# Patient Record
Sex: FEMALE | Race: BLACK OR AFRICAN AMERICAN | Employment: UNEMPLOYED | URBAN - METROPOLITAN AREA
[De-identification: names, ages, dates, MRNs, and addresses within clinical notes are randomized per-mention and may not be internally consistent; named-entity substitution may affect disease eponyms.]

---

## 2017-01-10 ENCOUNTER — DOCUMENTATION ONLY (OUTPATIENT)
Dept: FAMILY MEDICINE CLINIC | Age: 47
End: 2017-01-10

## 2017-01-10 NOTE — LETTER
1/10/2017 Kristine Ferrera [address] Aishwarya Mcwilliams [facility] [address] [id number] Dear Ms. Kristine Ferrera, We had an appointment reserved for you 12/28/2016 and were concerned when you did not show or call within 24 hours to cancel the appointment. Our policy is to call patients two days prior to their appointment to remind them of the date and time. We perform these calls as a courtesy to our patients and to allow us the opportunity to rebook the time slot should the appointment not be necessary. Recognizing that everyones time is valuable and that appointment time is limited, we ask that you provide 24 hours notice if you are unable to keep your appointment. Please call us at your earliest convenience to reschedule your appointment as your provider felt it was important to see you. Thank you for your anticipated cooperation. The scheduling staff: 
 
[address] [place] [address],[address] [place] [id number] 
[phone]

## 2017-01-12 ENCOUNTER — DOCUMENTATION ONLY (OUTPATIENT)
Dept: FAMILY MEDICINE CLINIC | Age: 47
End: 2017-01-12

## 2017-01-16 ENCOUNTER — OFFICE VISIT (OUTPATIENT)
Dept: FAMILY MEDICINE CLINIC | Age: 47
End: 2017-01-16

## 2017-01-16 ENCOUNTER — HOSPITAL ENCOUNTER (OUTPATIENT)
Dept: LAB | Age: 47
Discharge: HOME | End: 2017-01-16
Payer: MEDICARE

## 2017-01-16 VITALS
TEMPERATURE: 96.1 F | HEART RATE: 90 BPM | RESPIRATION RATE: 14 BRPM | WEIGHT: 225 LBS | SYSTOLIC BLOOD PRESSURE: 140 MMHG | HEIGHT: 66 IN | BODY MASS INDEX: 36.16 KG/M2 | DIASTOLIC BLOOD PRESSURE: 91 MMHG | OXYGEN SATURATION: 99 %

## 2017-01-16 DIAGNOSIS — E11.42 TYPE 2 DIABETES MELLITUS WITH DIABETIC POLYNEUROPATHY, WITH LONG-TERM CURRENT USE OF INSULIN (HCC): ICD-10-CM

## 2017-01-16 DIAGNOSIS — N17.9 AKI (ACUTE KIDNEY INJURY) (HCC): Primary | ICD-10-CM

## 2017-01-16 DIAGNOSIS — I10 ESSENTIAL HYPERTENSION: ICD-10-CM

## 2017-01-16 DIAGNOSIS — M54.31 SCIATICA OF RIGHT SIDE: ICD-10-CM

## 2017-01-16 DIAGNOSIS — Z79.4 TYPE 2 DIABETES MELLITUS WITH DIABETIC POLYNEUROPATHY, WITH LONG-TERM CURRENT USE OF INSULIN (HCC): ICD-10-CM

## 2017-01-16 DIAGNOSIS — I50.22 CHRONIC SYSTOLIC CONGESTIVE HEART FAILURE (HCC): ICD-10-CM

## 2017-01-16 DIAGNOSIS — J44.9 CHRONIC OBSTRUCTIVE PULMONARY DISEASE, UNSPECIFIED COPD TYPE (HCC): ICD-10-CM

## 2017-01-16 DIAGNOSIS — E78.5 HYPERLIPIDEMIA, UNSPECIFIED HYPERLIPIDEMIA TYPE: ICD-10-CM

## 2017-01-16 PROCEDURE — 80307 DRUG TEST PRSMV CHEM ANLYZR: CPT | Performed by: INTERNAL MEDICINE

## 2017-01-16 RX ORDER — OXYCODONE AND ACETAMINOPHEN 10; 325 MG/1; MG/1
1 TABLET ORAL
Qty: 120 TAB | Refills: 0 | Status: SHIPPED | OUTPATIENT
Start: 2017-01-16 | End: 2017-02-10 | Stop reason: SDUPTHER

## 2017-01-16 RX ORDER — CYCLOBENZAPRINE HCL 10 MG
10 TABLET ORAL
Qty: 90 TAB | Refills: 2 | Status: SHIPPED | OUTPATIENT
Start: 2017-01-16 | End: 2017-02-03 | Stop reason: SDUPTHER

## 2017-01-16 NOTE — MR AVS SNAPSHOT
Visit Information Date & Time Provider Department Dept. Phone Encounter #  
 1/16/2017 11:45 AM John Venegas, Gerardo [facility] [phone] [id number] Upcoming Health Maintenance Date Due  
 LIPID PANEL Q1 1972 FOOT EXAM Q1 1/19/1982 MICROALBUMIN Q1 1/19/1982 EYE EXAM RETINAL OR DILATED Q1 1/19/1982 Pneumococcal 19-64 Medium Risk (1 of 1 - PPSV23) 1/19/1991 DTaP/Tdap/Td series (1 - Tdap) 1/19/1993 PAP AKA CERVICAL CYTOLOGY 1/19/1993 HEMOGLOBIN A1C Q6M 6/2/2017 Allergies as of 1/16/2017  Review Complete On: 1/16/2017 By: John Venegas MD  
  
 Severity Noted Reaction Type Reactions [place] Juice High 12/03/2016    Anaphylaxis Pcn [Penicillins]  09/25/2016    Rash Current Immunizations  Reviewed on 12/3/2016 Name Date Influenza Vaccine 10/1/2016 Not reviewed this visit You Were Diagnosed With   
  
 Codes Comments PRETTY (acute kidney injury) ([facility]    -  Primary ICD-10-CM: N17.9 ICD-9-CM: 584.9 Sciatica of right side     ICD-10-CM: M54.31 
ICD-9-CM: 498. 3 Chronic obstructive pulmonary disease, unspecified COPD type ([facility]     ICD-10-CM: J44.9 ICD-9-CM: 033 Type 2 diabetes mellitus with diabetic polyneuropathy, with long-term current use of insulin (HCC)     ICD-10-CM: E11.42, Z79.4 ICD-9-CM: 250.60, 357.2, V58.67 Chronic systolic congestive heart failure (HCC)     ICD-10-CM: I50.22 ICD-9-CM: 428.22, 428.0 Essential hypertension     ICD-10-CM: I10 
ICD-9-CM: 401.9 Hyperlipidemia, unspecified hyperlipidemia type     ICD-10-CM: E78.5 ICD-9-CM: 272.4 Vitals BP Pulse Temp Resp Height(growth percentile) Weight(growth percentile) (!) 140/91 (BP 1 Location: Right arm, BP Patient Position: Sitting) 90 96.1 °F (35.6 °C) (Oral) 14 5' 6\" (1.676 m) 225 lb (102.1 kg) LMP SpO2 BMI OB Status Smoking Status 01/12/2017 99% 36.32 kg/m2 Having regular periods Current Every Day Smoker Vitals History BMI and BSA Data Body Mass Index Body Surface Area  
 36.32 kg/m 2 2.18 m 2 Preferred Pharmacy Pharmacy Name [facility] [address], [place] Zuh[place] [phone] Your Updated Medication List  
  
   
This list is accurate as of: 1/16/17 12:27 PM.  Always use your most recent med list.  
  
  
  
  
 albuterol 90 mcg/actuation inhaler Commonly known as:  PROVENTIL HFA, VENTOLIN HFA, PROAIR HFA Take 2 Puffs by inhalation every four (4) hours as needed for Wheezing. aspirin delayed-release 81 mg tablet Take 1 Tab by mouth daily. atorvastatin 20 mg tablet Commonly known as:  LIPITOR Take  by mouth daily. cloNIDine HCl 0.1 mg tablet Commonly known as:  CATAPRES Take 0.1 mg by mouth two (2) times a day. cyclobenzaprine 10 mg tablet Commonly known as:  FLEXERIL Take 1 Tab by mouth three (3) times daily as needed for Muscle Spasm(s). fluticasone-salmeterol 500-50 mcg/dose diskus inhaler Commonly known as:  ADVAIR DISKUS Take 1 Puff by inhalation every twelve (12) hours. furosemide 20 mg tablet Commonly known as:  LASIX Daily  
  
 hydrOXYzine HCl 25 mg tablet Commonly known as:  ATARAX Take 25 mg by mouth three (3) times daily as needed for Itching. LANTUS 100 unit/mL injection Generic drug:  insulin glargine 40 Units by SubCUTAneous route nightly. losartan-hydroCHLOROthiazide 100-12.5 mg per tablet Commonly known as:  HYZAAR Take 1 Tab by mouth daily. metFORMIN 1,000 mg tablet Commonly known as:  GLUCOPHAGE Take 1,000 mg by mouth two (2) times daily (with meals). metoprolol succinate 100 mg tablet Commonly known as:  TOPROL-XL Take 100 mg by mouth daily. NIFEdipine ER 60 mg ER tablet Commonly known as:  ADALAT CC Take 60 mg by mouth daily. NovoLOG 100 unit/mL injection Generic drug:  insulin aspart 30 Units by SubCUTAneous route Before breakfast, lunch, and dinner. omeprazole 40 mg capsule Commonly known as:  PRILOSEC Take 40 mg by mouth daily. oxyCODONE-acetaminophen  mg per tablet Commonly known as:  PERCOCET 10 Take 1 Tab by mouth every six (6) hours as needed for Pain. Max Daily Amount: 4 Tabs. Indications: PAIN  
  
 sertraline 50 mg tablet Commonly known as:  ZOLOFT Take 1 Tab by mouth daily. tiotropium bromide 2.5 mcg/actuation inhaler Commonly known as:  Otelia Clara Take 2 Puffs by inhalation daily. traZODone 100 mg tablet Commonly known as:  Orma Paddy Take 100 mg by mouth nightly. Prescriptions Printed Refills  
 oxyCODONE-acetaminophen (PERCOCET 10)  mg per tablet 0 Sig: Take 1 Tab by mouth every six (6) hours as needed for Pain. Max Daily Amount: 4 Tabs. Indications: PAIN Class: Print Route: Oral  
  
Prescriptions Sent to Pharmacy Refills  
 cyclobenzaprine (FLEXERIL) 10 mg tablet 2 Sig: Take 1 Tab by mouth three (3) times daily as needed for Muscle Spasm(s). Class: Normal  
 Pharmacy: [facility] [address], [address] Ph #: [phone] Route: Oral  
  
To-Do List   
 01/16/2017 Lab:  LIPID PANEL   
  
 01/16/2017 Lab:  METABOLIC PANEL, COMPREHENSIVE   
  
 01/16/2017 Imaging:  MRI LUMB SPINE W WO CONT Introducing [facility]! Gladis Roland introduces [employer] patient portal. Now you can access parts of your medical record, email your doctor's office, and request medication refills online. 1. In your internet browser, go to [email or web address] 2. Click on the First Time User? Click Here link in the Sign In box. You will see the New Member Sign Up page. 3. Enter your [employer] Access Code exactly as it appears below. You will not need to use this code after youve completed the sign-up process.  If you do not sign up before the expiration date, you must request a new code. · See[employer] Access Code: [email or web address] Expires: 4/16/2017 12:21 PM 
 
4. Enter the last four digits of your Social Security Number (xxxx) and Date of Birth (mm/dd/yyyy) as indicated and click Submit. You will be taken to the next sign-up page. 5. Create a Seeo ID. This will be your Seeo login ID and cannot be changed, so think of one that is secure and easy to remember. 6. Create a Seeo password. You can change your password at any time. 7. Enter your Password Reset Question and Answer. This can be used at a later time if you forget your password. 8. Enter your e-mail address. You will receive e-mail notification when new information is available in [address] 9. Click Sign Up. You can now view and download portions of your medical record. 10. Click the Download Summary menu link to download a portable copy of your medical information. If you have questions, please visit the Frequently Asked Questions section of the Seeo website. Remember, Seeo is NOT to be used for urgent needs. For medical emergencies, dial 911. Now available from your iPhone and Android! Please provide this summary of care documentation to your next provider. Your primary care clinician is listed as Steffen Chamorro. If you have any questions after today's visit, please call [phone].

## 2017-01-16 NOTE — PROGRESS NOTES
Patient presents to clinic stating she has been experiencing pain in her lower back into her right leg that has been worsening for the past month. Advance Directive:    1. Do you have an advance directive in place? Patient Reply:     2. If not, would you like material regarding how to put one in place? Patient Reply:      Coordination of Care:    1. Have you been to the ER, urgent care clinic since your last visit? Hospitalized since your last visit? [address] [place] [address] ER for pain in right leg     2. Have you seen or consulted any other health care providers outside of the [address] [place] [address] since your last visit? Include any pap smears or colon screening. No     Depression Screening completed. Learning Assessment completed. Abuse Screening completed. Health Maintenance reviewed and discussed per provider.

## 2017-01-16 NOTE — PROGRESS NOTES
History of Present Illness  Rosaline Ott is a 42 y.o. female who presents today for management of    Chief Complaint   Patient presents with    Back Pain    Hypertension    Diabetes       Patient complains of right-sided back pain from her sciatica. Pain intensity 10/10, pain radiates down to the back of her thighs and legs. Her toes on the right side feels numb. She was in [place] over the holidays and was seen in the ER there for severe pain. She ran out of percocet 1 week ago. Records from her previous PCP in [place] have not been received yet. She denies any fever, chills, chronic steroid use, IV drug use. She is compliant with all her medications - insulin, htn meds  She is trying to follow a low salt, low calorie diet. Her breathing is much improved after starting Spiriva. She also takes Advair. She has not used her rescue inhaler since starting Spiriva. She had chest pain 2 days ago which resolved after taking nitro. She denies dyspnea on exertion, leg swelling. Problem List  Patient Active Problem List    Diagnosis Date Noted    Essential hypertension 12/06/2016    Type 2 diabetes mellitus with diabetic polyneuropathy, with long-term current use of insulin ([address] 12/06/2016    Chronic obstructive pulmonary disease ([address] 12/06/2016    Hyperlipidemia 12/06/2016    Chronic systolic congestive heart failure ([address] 12/06/2016    Coronary artery disease involving native coronary artery of native heart without angina pectoris 12/06/2016    Sciatica of right side 12/06/2016    History of rheumatoid arthritis 12/06/2016    Tobacco use 12/02/2016    GERD (gastroesophageal reflux disease) 12/02/2016    PRETTY (acute kidney injury) ([facility] 12/02/2016       Current Medications  Current Outpatient Prescriptions   Medication Sig Dispense    oxyCODONE-acetaminophen (PERCOCET 10)  mg per tablet Take 1 Tab by mouth every six (6) hours as needed for Pain. Max Daily Amount: 4 Tabs.  Indications: PAIN 120 Tab    cyclobenzaprine (FLEXERIL) 10 mg tablet Take 1 Tab by mouth three (3) times daily as needed for Muscle Spasm(s). 90 Tab    traZODone (DESYREL) 100 mg tablet Take 100 mg by mouth nightly.  tiotropium bromide (SPIRIVA RESPIMAT) 2.5 mcg/actuation inhaler Take 2 Puffs by inhalation daily. 1 Inhaler    aspirin delayed-release 81 mg tablet Take 1 Tab by mouth daily. 100 Tab    furosemide (LASIX) 20 mg tablet Daily 60 Tab    sertraline (ZOLOFT) 50 mg tablet Take 1 Tab by mouth daily. 30 Tab    NIFEdipine ER (ADALAT CC) 60 mg ER tablet Take 60 mg by mouth daily.  insulin aspart (NOVOLOG) 100 unit/mL injection 30 Units by SubCUTAneous route Before breakfast, lunch, and dinner.  insulin glargine (LANTUS) 100 unit/mL injection 40 Units by SubCUTAneous route nightly.  metFORMIN (GLUCOPHAGE) 1,000 mg tablet Take 1,000 mg by mouth two (2) times daily (with meals).  cloNIDine HCl (CATAPRES) 0.1 mg tablet Take 0.1 mg by mouth two (2) times a day.  hydrOXYzine (ATARAX) 25 mg tablet Take 25 mg by mouth three (3) times daily as needed for Itching.  omeprazole (PRILOSEC) 40 mg capsule Take 40 mg by mouth daily.  losartan-hydrochlorothiazide (HYZAAR) 100-12.5 mg per tablet Take 1 Tab by mouth daily.  atorvastatin (LIPITOR) 20 mg tablet Take  by mouth daily.  metoprolol succinate (TOPROL-XL) 100 mg tablet Take 100 mg by mouth daily.  albuterol (PROVENTIL HFA, VENTOLIN HFA, PROAIR HFA) 90 mcg/actuation inhaler Take 2 Puffs by inhalation every four (4) hours as needed for Wheezing. 1 Inhaler    fluticasone-salmeterol (ADVAIR DISKUS) 500-50 mcg/dose diskus inhaler Take 1 Puff by inhalation every twelve (12) hours. 60 Each     No current facility-administered medications for this visit.         Allergies/Drug Reactions  Allergies   Allergen Reactions    Orange Juice Anaphylaxis    Pcn [Penicillins] Rash        Review of Systems  General ROS: negative for - chills, fatigue or fever  Psychological ROS: negative  Respiratory ROS: no cough, shortness of breath, or wheezing  Cardiovascular ROS: no chest pain or dyspnea on exertion  Gastrointestinal ROS: no abdominal pain, change in bowel habits, or black or bloody stools  Genito-Urinary ROS: no dysuria, trouble voiding, or hematuria  Musculoskeletal ROS: positive for - back and right-sided leg pain  Neurological ROS: negative for - dizziness, gait disturbance, impaired coordination/balance or memory loss  Dermatological ROS: negative      Physical Exam  Vital signs:   Vitals:    01/16/17 1154   BP: (!) 140/91   Pulse: 90   Resp: 14   Temp: 96.1 °F (35.6 °C)   TempSrc: Oral   SpO2: 99%   Weight: 225 lb (102.1 kg)   Height: 5' 6\" (1.676 m)       General: alert, oriented, not in distress  Chest/Lungs: clear breath sounds, no wheezing or crackles  Heart: normal rate, regular rhythm, no murmur  Abdomen: soft, non-distended, non-tender, normal bowel sounds, no organomegaly, no masses  Extremities: no focal deformities, no edema  Back: normal spinal curvature, no paraspinal tenderness, (+) tenderness over right lumbar area and right lateral thigh, strength 5/5 on LLE, could not assess strength on the left side due to pain. Component      Latest Ref Rng & Units 12/3/2016 12/3/2016 12/2/2016           3:50 AM  3:50 AM  5:23 AM   WBC      4.6 - 13.2 K/uL  22.7 (H)    RBC      4.20 - 5.30 M/uL  4.76    HGB      12.0 - 16.0 g/dL  13.1    HCT      35.0 - 45.0 %  39.7    MCV      74.0 - 97.0 FL  83.4    MCH      24.0 - 34.0 PG  27.5    MCHC      31.0 - 37.0 g/dL  33.0    RDW      11.6 - 14.5 %  17.0 (H)    PLATELET      921 - 505 K/uL  252    MPV      9.2 - 11.8 FL  12.0 (H)    NEUTROPHILS      40 - 73 %  88 (H)    LYMPHOCYTES      21 - 52 %  5 (L)    MONOCYTES      3 - 10 %  7    EOSINOPHILS      0 - 5 %  0    BASOPHILS      0 - 2 %  0    ABS. NEUTROPHILS      1.8 - 8.0 K/UL  20.0 (H)    ABS. LYMPHOCYTES      0.9 - 3.6 K/UL  1.0    ABS.  MONOCYTES 0.05 - 1.2 K/UL  1.6 (H)    ABS. EOSINOPHILS      0.0 - 0.4 K/UL  0.0    ABS. BASOPHILS      0.0 - 0.06 K/UL  0.0    DF        AUTOMATED    Sodium      136 - 145 mmol/L 135 (L)     Potassium      3.5 - 5.5 mmol/L 4.4     Chloride      100 - 108 mmol/L 104     CO2      21 - 32 mmol/L 21     Anion gap      3.0 - 18 mmol/L 10     Glucose      74 - 99 mg/dL 202 (H)     BUN      7.0 - 18 MG/DL 19 (H)     Creatinine      0.6 - 1.3 MG/DL 1.22     BUN/Creatinine ratio      12 - 20   16     GFR est AA      >60 ml/min/1.73m2 58 (L)     GFR est non-AA      >60 ml/min/1.73m2 48 (L)     Calcium      8.5 - 10.1 MG/DL 8.8     Hemoglobin A1c, (calculated)      4.2 - 5.6 %   6.2 (H)   Est. average glucose      mg/dL   131     Assessment/Plan:      1. Essential hypertension  - continue nifedipine, metoprolol, Hyzaar and clonidine     2. Type 2 diabetes mellitus with diabetic polyneuropathy, with long-term current use of insulin (HCC)  - continue Lantus 40 units at HS and Novolog 30 units premeals  - repeat HbAin in 6 months     3. Chronic obstructive pulmonary disease, unspecified COPD type (HCC)  - cont Advair, Spiriva prn albuterol, d/c ellipta     4. Hyperlipidemia, unspecified hyperlipidemia type  - cont Lipitor 20mg daily     5. Chronic systolic congestive heart failure (HCC)  - continue Hyzaar, metoprolol, ASA  - cont Lasix 20mg daily  - Advised patient for salt restriction in diet. - Sign, symptoms of CHF and diagnosis and management for CHF was discussed with patient in detail.   - Patient was advised to obtain daily am weight and if there is weight gain > 5 lbs over 3-4 days, was advised to take extra dose of diuretics for few days and once achieve baseline weight, reduce back to maintanance dose. - will need cardio referral in the future     6.  Coronary artery disease involving native coronary artery of native heart without angina pectoris  - continue current medications     7. Sciatica of right side  - refilled cyclobenzaprine (FLEXERIL) 10 mg tablet; Take 1 Tab by mouth three (3) times daily as needed for Muscle Spasm(s). Dispense: 90 Tab; Refill: 0  - refilled oxyCODONE-acetaminophen (PERCOCET 10)  mg per tablet; Take 1 Tab by mouth every six (6) hours as needed for Pain. Max Daily Amount: 4 Tabs. Indications: PAIN Dispense: 120 Tab; Refill: 0  - ordered MRI of the lumbar spine  - will need pain management referral in the future    Follow-up Disposition:  Return in about 3 months (around 4/16/2017) for rov. I have discussed the diagnosis with the patient and the intended plan as seen in the above orders. The patient has received an after-visit summary and questions were answered concerning future plans. I have discussed medication side effects and warnings with the patient as well. I have reviewed the plan of care with the patient, accepted their input and they are in agreement with the treatment goals.        Leslie Ortega MD  January 16, 2017

## 2017-01-17 LAB
AMPHETAMINES UR QL SCN: NEGATIVE NG/ML
BARBITURATES UR QL SCN: NEGATIVE NG/ML
BENZODIAZ UR QL: POSITIVE NG/ML
BZE UR QL: NEGATIVE NG/ML
CANNABINOIDS UR QL SCN: NEGATIVE NG/ML
DRUG SCREEN COMMENT:, 753798: NORMAL
OPIATES UR QL: NEGATIVE NG/ML
PCP UR QL: NEGATIVE NG/ML

## 2017-01-31 ENCOUNTER — OFFICE VISIT (OUTPATIENT)
Dept: FAMILY MEDICINE CLINIC | Age: 47
End: 2017-01-31

## 2017-01-31 VITALS
BODY MASS INDEX: 35.68 KG/M2 | DIASTOLIC BLOOD PRESSURE: 80 MMHG | HEART RATE: 63 BPM | RESPIRATION RATE: 20 BRPM | OXYGEN SATURATION: 97 % | HEIGHT: 66 IN | SYSTOLIC BLOOD PRESSURE: 130 MMHG | TEMPERATURE: 97.8 F | WEIGHT: 222 LBS

## 2017-01-31 DIAGNOSIS — F32.A ANXIETY AND DEPRESSION: ICD-10-CM

## 2017-01-31 DIAGNOSIS — Z79.4 TYPE 2 DIABETES MELLITUS WITH DIABETIC POLYNEUROPATHY, WITH LONG-TERM CURRENT USE OF INSULIN (HCC): Primary | ICD-10-CM

## 2017-01-31 DIAGNOSIS — F14.11 H/O COCAINE ABUSE (HCC): ICD-10-CM

## 2017-01-31 DIAGNOSIS — F11.90 CHRONIC NARCOTIC USE: ICD-10-CM

## 2017-01-31 DIAGNOSIS — F19.90 MISUSE OF PRESCRIPTION ONLY DRUGS: ICD-10-CM

## 2017-01-31 DIAGNOSIS — E78.5 HYPERLIPIDEMIA LDL GOAL <100: ICD-10-CM

## 2017-01-31 DIAGNOSIS — K21.00 GASTROESOPHAGEAL REFLUX DISEASE WITH ESOPHAGITIS: ICD-10-CM

## 2017-01-31 DIAGNOSIS — E11.42 TYPE 2 DIABETES MELLITUS WITH DIABETIC POLYNEUROPATHY, WITH LONG-TERM CURRENT USE OF INSULIN (HCC): Primary | ICD-10-CM

## 2017-01-31 DIAGNOSIS — J44.9 CHRONIC OBSTRUCTIVE PULMONARY DISEASE, UNSPECIFIED COPD TYPE (HCC): ICD-10-CM

## 2017-01-31 DIAGNOSIS — I50.22 CHRONIC SYSTOLIC CONGESTIVE HEART FAILURE (HCC): ICD-10-CM

## 2017-01-31 DIAGNOSIS — F41.9 ANXIETY AND DEPRESSION: ICD-10-CM

## 2017-01-31 DIAGNOSIS — I10 ESSENTIAL HYPERTENSION: ICD-10-CM

## 2017-01-31 LAB — GLUCOSE POC: 156 MG/DL

## 2017-01-31 RX ORDER — SERTRALINE HYDROCHLORIDE 50 MG/1
50 TABLET, FILM COATED ORAL DAILY
Qty: 30 TAB | Refills: 3 | Status: SHIPPED | OUTPATIENT
Start: 2017-01-31 | End: 2017-05-31 | Stop reason: SDUPTHER

## 2017-01-31 RX ORDER — OMEPRAZOLE 40 MG/1
40 CAPSULE, DELAYED RELEASE ORAL DAILY
Qty: 30 CAP | Refills: 3 | Status: SHIPPED | OUTPATIENT
Start: 2017-01-31 | End: 2017-05-31 | Stop reason: SDUPTHER

## 2017-01-31 RX ORDER — ALBUTEROL SULFATE 90 UG/1
2 AEROSOL, METERED RESPIRATORY (INHALATION)
Qty: 1 INHALER | Refills: 0 | Status: SHIPPED | OUTPATIENT
Start: 2017-01-31 | End: 2017-03-30 | Stop reason: SDUPTHER

## 2017-01-31 RX ORDER — LOSARTAN POTASSIUM AND HYDROCHLOROTHIAZIDE 12.5; 1 MG/1; MG/1
1 TABLET ORAL DAILY
Qty: 30 TAB | Refills: 3 | Status: SHIPPED | OUTPATIENT
Start: 2017-01-31 | End: 2017-05-31 | Stop reason: SDUPTHER

## 2017-01-31 RX ORDER — NIFEDIPINE 60 MG/1
60 TABLET, EXTENDED RELEASE ORAL DAILY
Qty: 30 TAB | Refills: 3 | Status: SHIPPED | OUTPATIENT
Start: 2017-01-31 | End: 2017-05-31 | Stop reason: SDUPTHER

## 2017-01-31 RX ORDER — INSULIN LISPRO 100 [IU]/ML
30 INJECTION, SUSPENSION SUBCUTANEOUS 2 TIMES DAILY
Qty: 1 PEN | Refills: 3 | Status: SHIPPED | OUTPATIENT
Start: 2017-01-31 | End: 2017-05-31 | Stop reason: ALTCHOICE

## 2017-01-31 RX ORDER — INSULIN ASPART 100 [IU]/ML
30 INJECTION, SOLUTION INTRAVENOUS; SUBCUTANEOUS
Qty: 10 ML | Refills: 3 | Status: CANCELLED | OUTPATIENT
Start: 2017-01-31

## 2017-01-31 RX ORDER — INSULIN GLARGINE 100 [IU]/ML
40 INJECTION, SOLUTION SUBCUTANEOUS
Qty: 1 VIAL | Refills: 3
Start: 2017-01-31 | End: 2017-05-31 | Stop reason: SDUPTHER

## 2017-01-31 RX ORDER — METFORMIN HYDROCHLORIDE 1000 MG/1
1000 TABLET ORAL 2 TIMES DAILY WITH MEALS
Qty: 60 TAB | Refills: 3 | Status: SHIPPED | OUTPATIENT
Start: 2017-01-31 | End: 2017-05-31 | Stop reason: SDUPTHER

## 2017-01-31 RX ORDER — METOPROLOL SUCCINATE 100 MG/1
100 TABLET, EXTENDED RELEASE ORAL DAILY
Qty: 30 TAB | Refills: 3 | Status: SHIPPED | OUTPATIENT
Start: 2017-01-31 | End: 2017-05-31 | Stop reason: SDUPTHER

## 2017-01-31 RX ORDER — ATORVASTATIN CALCIUM 20 MG/1
20 TABLET, FILM COATED ORAL DAILY
Qty: 30 TAB | Refills: 3 | Status: SHIPPED | OUTPATIENT
Start: 2017-01-31 | End: 2017-05-31 | Stop reason: SDUPTHER

## 2017-01-31 RX ORDER — FUROSEMIDE 20 MG/1
TABLET ORAL
Qty: 30 TAB | Refills: 3 | Status: SHIPPED | OUTPATIENT
Start: 2017-01-31 | End: 2017-05-31 | Stop reason: SDUPTHER

## 2017-01-31 RX ORDER — INSULIN LISPRO 100 [IU]/ML
INJECTION, SUSPENSION SUBCUTANEOUS
Refills: 0 | COMMUNITY
Start: 2017-01-27 | End: 2017-01-31 | Stop reason: SDUPTHER

## 2017-01-31 NOTE — PROGRESS NOTES
1. Have you been to the ER, urgent care clinic since your last visit? Hospitalized since your last visit? No    2. Have you seen or consulted any other health care providers outside of the [address] [place] [address] since your last visit? Include any pap smears or colon screening.  No

## 2017-01-31 NOTE — MR AVS SNAPSHOT
Visit Information Date & Time Provider Department Dept. Phone Encounter #  
 1/31/2017  9:30 AM Janessa Lucas, [address] [facility] [phone] [id number] Follow-up Instructions Return in about 3 months (around 4/30/2017), or if symptoms worsen or fail to improve, for f/u in 3 months for hypertension, high cholesterol. Upcoming Health Maintenance Date Due  
 LIPID PANEL Q1 1972 FOOT EXAM Q1 1/19/1982 MICROALBUMIN Q1 1/19/1982 EYE EXAM RETINAL OR DILATED Q1 1/19/1982 Pneumococcal 19-64 Medium Risk (1 of 1 - PPSV23) 1/19/1991 DTaP/Tdap/Td series (1 - Tdap) 1/19/1993 PAP AKA CERVICAL CYTOLOGY 1/19/1993 HEMOGLOBIN A1C Q6M 6/2/2017 Allergies as of 1/31/2017  Review Complete On: 1/31/2017 By: Bárbara Calhoun LPN Severity Noted Reaction Type Reactions Snowflake Juice High 12/03/2016    Anaphylaxis Pcn [Penicillins]  09/25/2016    Rash Current Immunizations  Reviewed on 12/3/2016 Name Date Influenza Vaccine 10/1/2016 Not reviewed this visit You Were Diagnosed With   
  
 Codes Comments Type 2 diabetes mellitus with diabetic polyneuropathy, with long-term current use of insulin (HCC)    -  Primary ICD-10-CM: E11.42, Z79.4 ICD-9-CM: 250.60, 357.2, V58.67 Essential hypertension     ICD-10-CM: I10 
ICD-9-CM: 401.9 Chronic obstructive pulmonary disease, unspecified COPD type ([facility]     ICD-10-CM: J44.9 ICD-9-CM: 974 Chronic systolic congestive heart failure (HCC)     ICD-10-CM: I50.22 ICD-9-CM: 428.22, 428.0 Anxiety and depression     ICD-10-CM: F41.9, F32.9 ICD-9-CM: 300.00, 311 Gastroesophageal reflux disease with esophagitis     ICD-10-CM: K21.0 ICD-9-CM: 530.11 Hyperlipidemia LDL goal <100     ICD-10-CM: E78.5 ICD-9-CM: 272.4 Vitals BP Pulse Temp Resp Height(growth percentile) Weight(growth percentile) 130/80 63 97.8 °F (36.6 °C) (Oral) 20 5' 6\" (1.676 m) 222 lb (100.7 kg) LMP SpO2 BMI OB Status Smoking Status 01/12/2017 97% 35.83 kg/m2 Having regular periods Current Every Day Smoker BMI and BSA Data Body Mass Index Body Surface Area  
 35.83 kg/m 2 2.17 m 2 Preferred Pharmacy Pharmacy Name Phone [id number] [phone] Your Updated Medication List  
  
   
This list is accurate as of: 1/31/17  9:33 AM.  Always use your most recent med list.  
  
  
  
  
 albuterol 90 mcg/actuation inhaler Commonly known as:  PROVENTIL HFA, VENTOLIN HFA, PROAIR HFA Take 2 Puffs by inhalation every four (4) hours as needed for Wheezing. aspirin delayed-release 81 mg tablet Take 1 Tab by mouth daily. atorvastatin 20 mg tablet Commonly known as:  LIPITOR Take 1 Tab by mouth daily. cloNIDine HCl 0.1 mg tablet Commonly known as:  CATAPRES Take 0.1 mg by mouth two (2) times a day. cyclobenzaprine 10 mg tablet Commonly known as:  FLEXERIL Take 1 Tab by mouth three (3) times daily as needed for Muscle Spasm(s). fluticasone-salmeterol 500-50 mcg/dose diskus inhaler Commonly known as:  ADVAIR DISKUS Take 1 Puff by inhalation every twelve (12) hours. furosemide 20 mg tablet Commonly known as:  LASIX Daily HumaLOG Mix 75-25 KwikPen 100 unit/mL (75-25) Inpn Generic drug:  Insulin Lisp & Lisp Prot (Hum) 30 Units by SubCUTAneous route two (2) times a day.  
  
 hydrOXYzine HCl 25 mg tablet Commonly known as:  ATARAX Take 25 mg by mouth three (3) times daily as needed for Itching. insulin glargine 100 unit/mL injection Commonly known as:  LANTUS  
40 Units by SubCUTAneous route nightly. losartan-hydroCHLOROthiazide 100-12.5 mg per tablet Commonly known as:  HYZAAR Take 1 Tab by mouth daily. metFORMIN 1,000 mg tablet Commonly known as:  GLUCOPHAGE Take 1 Tab by mouth two (2) times daily (with meals). metoprolol succinate 100 mg tablet Commonly known as:  TOPROL-XL Take 1 Tab by mouth daily. NIFEdipine ER 60 mg ER tablet Commonly known as:  ADALAT CC Take 1 Tab by mouth daily. NovoLOG 100 unit/mL injection Generic drug:  insulin aspart 30 Units by SubCUTAneous route Before breakfast, lunch, and dinner. omeprazole 40 mg capsule Commonly known as:  PRILOSEC Take 1 Cap by mouth daily. oxyCODONE-acetaminophen  mg per tablet Commonly known as:  PERCOCET 10 Take 1 Tab by mouth every six (6) hours as needed for Pain. Max Daily Amount: 4 Tabs. Indications: PAIN  
  
 sertraline 50 mg tablet Commonly known as:  ZOLOFT Take 1 Tab by mouth daily. tiotropium bromide 2.5 mcg/actuation inhaler Commonly known as:  Hall Rana Take 2 Puffs by inhalation daily. traZODone 100 mg tablet Commonly known as:  Jigar Oh Take 100 mg by mouth nightly. Prescriptions Sent to Pharmacy Refills  
 metFORMIN (GLUCOPHAGE) 1,000 mg tablet 3 Sig: Take 1 Tab by mouth two (2) times daily (with meals). Class: Normal  
 Pharmacy: [facility] [place] Ph #: [phone] Route: Oral  
 NIFEdipine ER (ADALAT CC) 60 mg ER tablet 3 Sig: Take 1 Tab by mouth daily. Class: Normal  
 Pharmacy: [facility] [place] Ph #: [phone] Route: Oral  
 sertraline (ZOLOFT) 50 mg tablet 3 Sig: Take 1 Tab by mouth daily. Class: Normal  
 Pharmacy: [facility] [place] Ph #: [phone] Route: Oral  
 furosemide (LASIX) 20 mg tablet 3 Sig: Daily Class: Normal  
 [id number]: [phone]  
 tiotropium bromide (SPIRIVA RESPIMAT) 2.5 mcg/actuation inhaler 5 Sig: Take 2 Puffs by inhalation daily.   
 Class: Normal  
 [facility] [phone] Route: Inhalation  
 losartan-hydroCHLOROthiazide (HYZAAR) 100-12.5 mg per tablet 3 Sig: Take 1 Tab by mouth daily. Class: Normal  
 Pharmacy: [facility] [place]  #: [phone] Route: Oral  
 atorvastatin (LIPITOR) 20 mg tablet 3 Sig: Take 1 Tab by mouth daily. Class: Normal  
 [facility] [phone] Route: Oral  
 metoprolol succinate (TOPROL-XL) 100 mg tablet 3 Sig: Take 1 Tab by mouth daily. Class: Normal  
 [facility]: [phone] Route: Oral  
 albuterol (PROVENTIL HFA, VENTOLIN HFA, PROAIR HFA) 90 mcg/actuation inhaler 0 Sig: Take 2 Puffs by inhalation every four (4) hours as needed for Wheezing. Class: Normal  
 [facility] [phone] Route: Inhalation  
 omeprazole (PRILOSEC) 40 mg capsule 3 Sig: Take 1 Cap by mouth daily. Class: Normal  
 Pharmacy: [facility] [place]  #: [phone] Route: Oral  
 HUMALOG MIX 75-25 KWIKPEN 100 unit/mL (75-25) inpn 3 Si Units by SubCUTAneous route two (2) times a day. Class: Normal  
 Pharmacy: [facility] [place]  #: [phone] Route: SubCUTAneous Follow-up Instructions Return in about 3 months (around 2017), or if symptoms worsen or fail to improve, for f/u in 3 months for hypertension, high cholesterol. Introducing [facility]! [facility] introduces [employer] patient portal. Now you can access parts of your medical record, email your doctor's office, and request medication refills online. 1. In your internet browser, go to [email or web address] 2. Click on the First Time User? Click Here link in the Sign In box. You will see the New Member Sign Up page. 3. Enter your [employer] Access Code exactly as it appears below. You will not need to use this code after youve completed the sign-up process. If you do not sign up before the expiration date, you must request a new code. · [employer] Access Code: [email or web address] Expires: 4/16/2017 12:21 PM 
 
4. Enter the last four digits of your Social Security Number (xxxx) and Date of Birth (mm/dd/yyyy) as indicated and click Submit. You will be taken to the next sign-up page. 5. Create a [employer] ID. This will be your [employer] login ID and cannot be changed, so think of one that is secure and easy to remember. 6. Create a [employer] password. You can change your password at any time. 7. Enter your Password Reset Question and Answer. This can be used at a later time if you forget your password. 8. Enter your e-mail address. You will receive e-mail notification when new information is available in [address] 9. Click Sign Up. You can now view and download portions of your medical record. 10. Click the Download Summary menu link to download a portable copy of your medical information. If you have questions, please visit the Frequently Asked Questions section of the [employer] website. Remember, [employer] is NOT to be used for urgent needs. For medical emergencies, dial 911. Now available from your iPhone and Android! Please provide this summary of care documentation to your next provider. Your primary care clinician is listed as Myranda Valentine. If you have any questions after today's visit, please call [phone].

## 2017-01-31 NOTE — PROGRESS NOTES
Bassem Stokes is a 41 y.o.  female and presents with    Chief Complaint   Patient presents with    Medication Refill    Pain (Chronic)    Diabetes    CHF    Obesity    Cholesterol Problem    Hypertension         Subjective:  Ms. Aleisha Damon presents today for medication refill. She wants refills on all medications including her Percocet for chronic pain. She has not had her insulin in a few days. She states that she does not check her blood glucose at home and states that the pharmacy recently changed her Novolog to Humalog but states that her blood glucose has been low that she has been taking her Lantus at night. Additional Concerns: Her pharmacy changed her Novolin 100mg to Humalog 75/25 and she has been taking the Humalog 75/25 30 units three times daily which is more than the recommended dosage. This medication change was not performed by her PCP or another doctor but the pharmacist.   Suspect prescription drug abuse as she is asking for more Percocet and she just had them refilled.       Patient Active Problem List   Diagnosis Code    Tobacco use Z72.0    GERD (gastroesophageal reflux disease) K21.9    PRETTY (acute kidney injury) ([facility] N17.9    Essential hypertension I10    Type 2 diabetes mellitus with diabetic polyneuropathy, with long-term current use of insulin (HCC) E11.42, Z79.4    Chronic obstructive pulmonary disease ([address] J44.9    Hyperlipidemia E78.5    Chronic systolic congestive heart failure (HCC) I50.22    Coronary artery disease involving native coronary artery of native heart without angina pectoris I25.10    Sciatica of right side M54.31    History of rheumatoid arthritis Z87.39     Patient Active Problem List    Diagnosis Date Noted    Essential hypertension 12/06/2016    Type 2 diabetes mellitus with diabetic polyneuropathy, with long-term current use of insulin ([address] 12/06/2016    Chronic obstructive pulmonary disease ([facility] 12/06/2016    Hyperlipidemia 12/06/2016    Chronic systolic congestive heart failure ([facility] 12/06/2016    Coronary artery disease involving native coronary artery of native heart without angina pectoris 12/06/2016    Sciatica of right side 12/06/2016    History of rheumatoid arthritis 12/06/2016    Tobacco use 12/02/2016    GERD (gastroesophageal reflux disease) 12/02/2016    PRETTY (acute kidney injury) ([facility] 12/02/2016     Current Outpatient Prescriptions   Medication Sig Dispense Refill    insulin glargine (LANTUS) 100 unit/mL injection 40 Units by SubCUTAneous route nightly. 1 Vial 3    metFORMIN (GLUCOPHAGE) 1,000 mg tablet Take 1 Tab by mouth two (2) times daily (with meals). 60 Tab 3    NIFEdipine ER (ADALAT CC) 60 mg ER tablet Take 1 Tab by mouth daily. 30 Tab 3    sertraline (ZOLOFT) 50 mg tablet Take 1 Tab by mouth daily. 30 Tab 3    furosemide (LASIX) 20 mg tablet Daily 30 Tab 3    tiotropium bromide (SPIRIVA RESPIMAT) 2.5 mcg/actuation inhaler Take 2 Puffs by inhalation daily. 1 Inhaler 5    losartan-hydroCHLOROthiazide (HYZAAR) 100-12.5 mg per tablet Take 1 Tab by mouth daily. 30 Tab 3    atorvastatin (LIPITOR) 20 mg tablet Take 1 Tab by mouth daily. 30 Tab 3    metoprolol succinate (TOPROL-XL) 100 mg tablet Take 1 Tab by mouth daily. 30 Tab 3    albuterol (PROVENTIL HFA, VENTOLIN HFA, PROAIR HFA) 90 mcg/actuation inhaler Take 2 Puffs by inhalation every four (4) hours as needed for Wheezing. 1 Inhaler 0    omeprazole (PRILOSEC) 40 mg capsule Take 1 Cap by mouth daily. 30 Cap 3    HUMALOG MIX 75-25 KWIKPEN 100 unit/mL (75-25) inpn 30 Units by SubCUTAneous route two (2) times a day. 1 Pen 3    oxyCODONE-acetaminophen (PERCOCET 10)  mg per tablet Take 1 Tab by mouth every six (6) hours as needed for Pain. Max Daily Amount: 4 Tabs. Indications: PAIN 120 Tab 0    cyclobenzaprine (FLEXERIL) 10 mg tablet Take 1 Tab by mouth three (3) times daily as needed for Muscle Spasm(s).  90 Tab 2    traZODone (DESYREL) 100 mg tablet Take 100 mg by mouth nightly.  aspirin delayed-release 81 mg tablet Take 1 Tab by mouth daily. 100 Tab 0    insulin aspart (NOVOLOG) 100 unit/mL injection 30 Units by SubCUTAneous route Before breakfast, lunch, and dinner.  cloNIDine HCl (CATAPRES) 0.1 mg tablet Take 0.1 mg by mouth two (2) times a day.  hydrOXYzine (ATARAX) 25 mg tablet Take 25 mg by mouth three (3) times daily as needed for Itching.  fluticasone-salmeterol (ADVAIR DISKUS) 500-50 mcg/dose diskus inhaler Take 1 Puff by inhalation every twelve (12) hours. 61 Each 0     Allergies   Allergen Reactions    Orange Juice Anaphylaxis    Pcn [Penicillins] Rash     Past Medical History   Diagnosis Date    Asthma     CAD (coronary artery disease)     [facility]ronic obstructive [facility] disease) [facility]     Crack cocaine use      last used August 2016    Diabetes ([facility]     Hypertension     Rheumatoid arthritis ([facility]      No past surgical history on file. Family History   Problem Relation Age of Onset   [facility] Hypertension Mother     Diabetes Mother     Lupus Mother     Kidney Disease Mother     Hypertension Father      Social History   Substance Use Topics    Smoking status: Current Every Day Smoker     Packs/day: 0.25    Smokeless tobacco: Not on file    Alcohol use Yes      Comment: socially       ROS   Respiratory ROS: no cough, shortness of breath, or wheezing  Cardiovascular ROS: no chest pain or dyspnea on exertion  Gastrointestinal ROS: no abdominal pain, change in bowel habits, or black or bloody stools  Genito-Urinary ROS: no dysuria, trouble voiding, or hematuria  Musculoskeletal ROS: positive for - joint pain and pain in leg - right    All other systems reviewed and are negative.       Objective:  Vitals:    01/31/17 0846   BP: 130/80   Pulse: 63   Resp: 20   Temp: 97.8 °F (36.6 °C)   TempSrc: Oral   SpO2: 97%   Weight: 222 lb (100.7 kg)   Height: 5' 6\" (1.676 m)   PainSc:   8   PainLoc: Hip   LMP: 01/12/2017 PHYSICAL EXAM  Alert, well appearing, and in no distress and obese. Mental status - alert, oriented to person, place, and time, affect appropriate to mood, drowsy  Chest - clear to auscultation, no wheezes, rales or rhonchi, symmetric air entry  Heart - normal rate, regular rhythm, normal S1, S2, no murmurs, rubs, clicks or gallops  Abdomen - bowel sounds normal      LABS   POC glucose 163      Assessment/Plan:    Diabetes - needs further observation, needs improvement, needs to follow diet more regularly. Refilled Lantus and metformin continue both as directed. Will continue Humalog 75/25 to take 30 units twice daily. Discontinue Novolog 100mg. Check blood glucose daily. Hypertension - well controlled, needs to follow diet more regularly. Refilled all BP medications, continue to take as directed. Hyperlipidemia - control uncertain, needs further observation, needs to follow diet more regularly. Refilled statin continue to take as directed. CHF - refilled Lasix, continue to take as directed. Chronic right sided back pain with sciatica - Will not refill Percocet at this time. She was just given a refill on 1/16/2017 by her PCM. Will write for referral to pain management. Never received records from previous doctor. COPD - Refilled Spiriva and albuterol continue to take as directed. Anxiety and depression - Refilled Zoloft continue to take as directed. GERD - Refilled omeprazole  Chronic narcotic use/Misuse of prescription drugs - Will not continue to refill Percocet. Referral placed to pain management. H/O cocaine abuse - She will not continue to get prescription narcotics from this prescriber due to past history. Lab review: no lab studies available for review at time of visit      I have discussed the diagnosis with the patient and the intended plan as seen in the above orders. The patient has received an after-visit summary and questions were answered concerning future plans.   I have discussed medication side effects and warnings with the patient as well. I have reviewed the plan of care with the patient, accepted their input and they are in agreement with the treatment goals. Follow-up Disposition:  Return in about 3 months (around 4/30/2017), or if symptoms worsen or fail to improve, for f/u in 3 months for hypertension, high cholesterol. More than 1/2 of this 30 minute visit was spent in counseling and coordination of care, as described above.       Arina Pedraza, RN, MSN, FNP-C

## 2017-02-02 ENCOUNTER — TELEPHONE (OUTPATIENT)
Dept: INTERNAL MEDICINE CLINIC | Age: 47
End: 2017-02-02

## 2017-02-03 DIAGNOSIS — I25.10 CORONARY ARTERY DISEASE INVOLVING NATIVE CORONARY ARTERY OF NATIVE HEART WITHOUT ANGINA PECTORIS: Primary | ICD-10-CM

## 2017-02-03 DIAGNOSIS — G89.29 OTHER CHRONIC PAIN: ICD-10-CM

## 2017-02-03 DIAGNOSIS — F11.90 CHRONIC, CONTINUOUS USE OF OPIOIDS: ICD-10-CM

## 2017-02-03 DIAGNOSIS — M54.31 SCIATICA OF RIGHT SIDE: ICD-10-CM

## 2017-02-03 RX ORDER — NITROGLYCERIN 0.4 MG/1
0.4 TABLET SUBLINGUAL
Qty: 1 BOTTLE | Refills: 0 | Status: SHIPPED | OUTPATIENT
Start: 2017-02-03 | End: 2017-02-03 | Stop reason: SDUPTHER

## 2017-02-03 RX ORDER — CYCLOBENZAPRINE HCL 10 MG
10 TABLET ORAL
Qty: 90 TAB | Refills: 2 | Status: SHIPPED | OUTPATIENT
Start: 2017-02-03 | End: 2017-03-15 | Stop reason: ALTCHOICE

## 2017-02-03 NOTE — TELEPHONE ENCOUNTER
Received text from pt stating that \"heart meds\" were not called in. Attempted to contact patient to get clarification. Pt not available. Left message for patient to call back.

## 2017-02-10 ENCOUNTER — OFFICE VISIT (OUTPATIENT)
Dept: FAMILY MEDICINE CLINIC | Age: 47
End: 2017-02-10

## 2017-02-10 VITALS
SYSTOLIC BLOOD PRESSURE: 141 MMHG | HEIGHT: 66 IN | DIASTOLIC BLOOD PRESSURE: 86 MMHG | RESPIRATION RATE: 15 BRPM | OXYGEN SATURATION: 99 % | BODY MASS INDEX: 34.23 KG/M2 | HEART RATE: 86 BPM | WEIGHT: 213 LBS | TEMPERATURE: 95.9 F

## 2017-02-10 DIAGNOSIS — F11.90 CHRONIC NARCOTIC USE: ICD-10-CM

## 2017-02-10 DIAGNOSIS — M79.18 MYOFASCIAL PAIN ON RIGHT SIDE: Primary | ICD-10-CM

## 2017-02-10 DIAGNOSIS — G89.4 CHRONIC PAIN SYNDROME: ICD-10-CM

## 2017-02-10 RX ORDER — TRIAMCINOLONE ACETONIDE 40 MG/ML
40 INJECTION, SUSPENSION INTRA-ARTICULAR; INTRAMUSCULAR ONCE
Qty: 1 ML | Refills: 0
Start: 2017-02-10 | End: 2017-02-10

## 2017-02-10 RX ORDER — OXYCODONE AND ACETAMINOPHEN 10; 325 MG/1; MG/1
1 TABLET ORAL
Qty: 45 TAB | Refills: 0 | Status: SHIPPED | OUTPATIENT
Start: 2017-02-14 | End: 2017-05-31

## 2017-02-10 RX ORDER — GABAPENTIN 300 MG/1
300 CAPSULE ORAL 3 TIMES DAILY
Qty: 90 CAP | Refills: 0 | Status: SHIPPED | OUTPATIENT
Start: 2017-02-10 | End: 2017-03-15 | Stop reason: SDUPTHER

## 2017-02-10 NOTE — PROGRESS NOTES
Patient presents to clinic stating she has blurred vision and needs pain medication. Advance Directive:    1. Do you have an advance directive in place? Patient Reply:     2. If not, would you like material regarding how to put one in place? Patient Reply:      Coordination of Care:    1. Have you been to the ER, urgent care clinic since your last visit? Hospitalized since your last visit? No    2. Have you seen or consulted any other health care providers outside of the [facility] since your last visit? Include any pap smears or colon screening. No    Depression Screening completed. Learning Assessment completed. Abuse Screening completed. Health Maintenance reviewed and discussed per provider.

## 2017-02-10 NOTE — PATIENT INSTRUCTIONS

## 2017-02-10 NOTE — PROGRESS NOTES
Kristine Ferrera is a 41 y.o.  female and presents with    Chief Complaint   Patient presents with    Pain (Chronic)     Subjective:  Patient complains of right-sided back pain from her sciatica and bilatera knee pain. Pain intensity 8/10, pain radiates down to the back of her thighs and legs. She was followed by orthopedic surgeon in [place] who instructed patient to have surgery. She has received cortisone injections in the past with some relief lasting 2-3 days. Her toes on the right side feels numb. She ran out of percocet 3 days ago. Diabetes Mellitus:  She has diabetes mellitus, and  hypertension, hyperlipidemia and obesity. Diabetic ROS - medication compliance: compliant all of the time, diabetic diet compliance: noncompliant some of the time, home glucose monitoring: is not performed. Lab review: labs reviewed, I note that glycosylated hemoglobin mildly abnormal but acceptable. She takes sertraline for depression.   She reports that she has been given xanax in the past.      Patient Active Problem List   Diagnosis Code    Tobacco use Z72.0    GERD (gastroesophageal reflux disease) K21.9    PRETTY (acute kidney injury) ([facility] N17.9    Essential hypertension I10    Type 2 diabetes mellitus with diabetic polyneuropathy, with long-term current use of insulin ([facility]) E11.42, Z79.4    Chronic obstructive pulmonary disease ([address] J44.9    Hyperlipidemia E78.5    Chronic systolic congestive heart failure ([facility] I50.22    Coronary artery disease involving native coronary artery of native heart without angina pectoris I25.10    Sciatica of right side M54.31    History of rheumatoid arthritis Z87.39    H/O cocaine abuse Z87.898    Chronic narcotic use F11.90    Misuse of prescription only drugs F19.10     Patient Active Problem List    Diagnosis Date Noted    H/O cocaine abuse 01/31/2017    Chronic narcotic use 01/31/2017    Misuse of prescription only drugs 01/31/2017    Essential hypertension 12/06/2016    Type 2 diabetes mellitus with diabetic polyneuropathy, with long-term current use of insulin ([facility] 12/06/2016    Chronic obstructive pulmonary disease ([facility] 12/06/2016    Hyperlipidemia 12/06/2016    Chronic systolic congestive heart failure ([facility] 12/06/2016    Coronary artery disease involving native coronary artery of native heart without angina pectoris 12/06/2016    Sciatica of right side 12/06/2016    History of rheumatoid arthritis 12/06/2016    Tobacco use 12/02/2016    GERD (gastroesophageal reflux disease) 12/02/2016    PRETTY (acute kidney injury) ([facility] 12/02/2016     Current Outpatient Prescriptions   Medication Sig Dispense Refill    nitroglycerin (NITROSTAT) 0.4 mg SL tablet 1 Tab by SubLINGual route as needed for Chest Pain. 1 Bottle 0    NIFEDICAL XL 60 mg ER tablet take 1 tablet by mouth once daily  0    cyclobenzaprine (FLEXERIL) 10 mg tablet Take 1 Tab by mouth three (3) times daily as needed for Muscle Spasm(s). 90 Tab 2    insulin glargine (LANTUS) 100 unit/mL injection 40 Units by SubCUTAneous route nightly. 1 Vial 3    metFORMIN (GLUCOPHAGE) 1,000 mg tablet Take 1 Tab by mouth two (2) times daily (with meals). 60 Tab 3    NIFEdipine ER (ADALAT CC) 60 mg ER tablet Take 1 Tab by mouth daily. 30 Tab 3    sertraline (ZOLOFT) 50 mg tablet Take 1 Tab by mouth daily. 30 Tab 3    furosemide (LASIX) 20 mg tablet Daily 30 Tab 3    tiotropium bromide (SPIRIVA RESPIMAT) 2.5 mcg/actuation inhaler Take 2 Puffs by inhalation daily. 1 Inhaler 5    losartan-hydroCHLOROthiazide (HYZAAR) 100-12.5 mg per tablet Take 1 Tab by mouth daily. 30 Tab 3    atorvastatin (LIPITOR) 20 mg tablet Take 1 Tab by mouth daily. 30 Tab 3    metoprolol succinate (TOPROL-XL) 100 mg tablet Take 1 Tab by mouth daily. 30 Tab 3    albuterol (PROVENTIL HFA, VENTOLIN HFA, PROAIR HFA) 90 mcg/actuation inhaler Take 2 Puffs by inhalation every four (4) hours as needed for Wheezing.  1 Inhaler 0  omeprazole (PRILOSEC) 40 mg capsule Take 1 Cap by mouth daily. 30 Cap 3    HUMALOG MIX 75-25 KWIKPEN 100 unit/mL (75-25) inpn 30 Units by SubCUTAneous route two (2) times a day. 1 Pen 3    oxyCODONE-acetaminophen (PERCOCET 10)  mg per tablet Take 1 Tab by mouth every six (6) hours as needed for Pain. Max Daily Amount: 4 Tabs. Indications: PAIN 120 Tab 0    traZODone (DESYREL) 100 mg tablet Take 100 mg by mouth nightly.  aspirin delayed-release 81 mg tablet Take 1 Tab by mouth daily. 100 Tab 0    insulin aspart (NOVOLOG) 100 unit/mL injection 30 Units by SubCUTAneous route Before breakfast, lunch, and dinner.  cloNIDine HCl (CATAPRES) 0.1 mg tablet Take 0.1 mg by mouth two (2) times a day.  hydrOXYzine (ATARAX) 25 mg tablet Take 25 mg by mouth three (3) times daily as needed for Itching.  fluticasone-salmeterol (ADVAIR DISKUS) 500-50 mcg/dose diskus inhaler Take 1 Puff by inhalation every twelve (12) hours. 61 Each 0     Allergies   Allergen Reactions    Orange Juice Anaphylaxis    Pcn [Penicillins] Rash     Past Medical History   Diagnosis Date    Asthma     CAD (coronary artery disease)     [facility]) [facility]     Crack cocaine use      last used August 2016    Diabetes ([facility]     Hypertension     Rheumatoid arthritis ([facility]      History reviewed. No pertinent past surgical history.   Family History   Problem Relation Age of Onset   [facility] Hypertension Mother     Diabetes Mother     Lupus Mother     Kidney Disease Mother     Hypertension Father      Social History   Substance Use Topics    Smoking status: Current Every Day Smoker     Packs/day: 0.25    Smokeless tobacco: Not on file    Alcohol use Yes      Comment: socially       ROS   General ROS: negative for - chills, fatigue or fever  Psychological ROS: negative  Respiratory ROS: no cough, shortness of breath, or wheezing  Cardiovascular ROS: no chest pain or dyspnea on exertion  Gastrointestinal ROS: no abdominal pain, change in bowel habits, or black or bloody stools  Genito-Urinary ROS: no dysuria, trouble voiding, or hematuria  Musculoskeletal ROS: positive for - back and right-sided leg pain  Neurological ROS: negative for - dizziness, gait disturbance, impaired coordination/balance or memory loss  Dermatological ROS: negative    All other systems reviewed and are negative.       Objective:  Vitals:    02/10/17 1313   BP: 141/86   Pulse: 86   Resp: 15   Temp: 95.9 °F (35.5 °C)   TempSrc: Oral   SpO2: 99%   Weight: 213 lb (96.6 kg)   Height: 5' 6\" (1.676 m)   PainSc:   8   PainLoc: Knee   LMP: 01/12/2017       General: alert, oriented, not in distress  Chest/Lungs: clear breath sounds, no wheezing or crackles  Heart: normal rate, regular rhythm, no murmur  Abdomen: soft, non-distended, non-tender, normal bowel sounds, no organomegaly, no masses  Extremities: no focal deformities, no edema  Back: normal spinal curvature, no paraspinal tenderness, (+) tenderness over right lumbar area and right lateral thigh,   Neuro - antalgic gait    [facility]  OFFICE PROCEDURE PROGRESS NOTE        Chart reviewed for the following:   Maria Gloevr MD, have reviewed the History, Physical and updated the Allergic reactions for [address] performed immediately prior to start of procedure:   Maria Glover MD, have performed the following reviews on Shiva Tobin prior to the start of the procedure:            * Patient was identified by name and date of birth   * Agreement on procedure being performed was verified  * Risks and Benefits explained to the patient  * Procedure site verified and marked as necessary  * Patient was positioned for comfort  * understanding confirmed and consent was signed and verified     Time: .2:02 PM       Date of procedure: 2/10/2017    Procedure performed by:  Mara Tiwari MD    Provider assisted by: Bárbara Cleaning LPN    Patient assisted by: self    How tolerated by patient: tolerated the procedure well with no complications    Comments: none    after obtaining informed consent the Right lower back was prepped in sterile fashion; ethyl chloride used for topical anesthesia; 1 cc 1:1:1 marcaine 0.5%, lidocaine 1% without epi and kenalog 40 mg/cc injected into  3 individual trigger points along right lower back; EBL < 1 cc; post procedure pain 1/10      LABS   Urine drug screen + benzodiazepine      Assessment/Plan:    1. Myofascial pain on right side    - TRIAMCINOLONE ACETONIDE INJ  - triamcinolone acetonide (KENALOG) 40 mg/mL injection; 1 mL by IntraMUSCular route once for 1 dose. Dispense: 1 mL; Refill: 0  - INJECT TRIGGER POINTS, > 3    2. Chronic pain syndrome  Reviewed ; pt has received medication solely from Dr. ROBERTSON      3. Chronic narcotic use  Concern for amount of medication pt has received and how quickly she used the medication; alternative pain medication, gabapentin, prescribed for maintenance; pt instructed that start in 4 days the percocet is to be used only for breakthrough at no more than #3 per day; she will return in 2 weeks for pill count  - oxyCODONE-acetaminophen (PERCOCET 10)  mg per tablet; Take 1 Tab by mouth every six (6) hours as needed for Pain. Max Daily Amount: 4 Tabs. Indications: Pain  Dispense: 45 Tab; Refill: 0      Lab review: no lab studies available for review at time of visit      I have discussed the diagnosis with the patient and the intended plan as seen in the above orders. The patient has received an after-visit summary and questions were answered concerning future plans. I have discussed medication side effects and warnings with the patient as well. I have reviewed the plan of care with the patient, accepted their input and they are in agreement with the treatment goals. Follow-up Disposition:  Return in about 2 weeks (around 2/24/2017) for pain management.   More than 1/2 of this 40 minute visit was spent in counselling and coordination of care, as described above.

## 2017-02-10 NOTE — MR AVS SNAPSHOT
Visit Information Date & Time Provider Department Dept. Phone Encounter #  
 2/10/2017  1:00 PM Frantz Bright, [address] [facility] [phone] Follow-up Instructions Return in about 2 weeks (around 2/24/2017) for pain management. Your Appointments 4/28/2017  9:30 AM  
Office Visit with [place] [address]) Appt Note: Return in about 3 months (around 4/30/2017), or if symptoms worsen or fail to improve, for f/u in 3 months for hypertension, high cholesterol. [address]  
  
   
 [address] [facility] [address] Upcoming Health Maintenance Date Due  
 LIPID PANEL Q1 1972 FOOT EXAM Q1 1/19/1982 MICROALBUMIN Q1 1/19/1982 EYE EXAM RETINAL OR DILATED Q1 1/19/1982 Pneumococcal 19-64 Medium Risk (1 of 1 - PPSV23) 1/19/1991 DTaP/Tdap/Td series (1 - Tdap) 1/19/1993 PAP AKA CERVICAL CYTOLOGY 1/19/1993 HEMOGLOBIN A1C Q6M 6/2/2017 Allergies as of 2/10/2017  Review Complete On: 2/10/2017 By: Frantz Bright MD  
  
 Severity Noted Reaction Type Reactions [place] Juice High 12/03/2016    Anaphylaxis Pcn [Penicillins]  09/25/2016    Rash Current Immunizations  Reviewed on 12/3/2016 Name Date Influenza Vaccine 10/1/2016 Not reviewed this visit You Were Diagnosed With   
  
 Codes Comments Myofascial pain on right side    -  Primary ICD-10-CM: M79.1 ICD-9-CM: 729.1 Chronic pain syndrome     ICD-10-CM: G89.4 ICD-9-CM: 338. 4 Chronic narcotic use     ICD-10-CM: F11.90 ICD-9-CM: 305.50 Vitals BP Pulse Temp Resp Height(growth percentile) Weight(growth percentile) 141/86 (BP 1 Location: Right arm, BP Patient Position: Sitting) 86 95.9 °F (35.5 °C) (Oral) 15 5' 6\" (1.676 m) 213 lb (96.6 kg) LMP SpO2 BMI OB Status Smoking Status 01/12/2017 99% 34.38 kg/m2 Having regular periods Current Every Day Smoker Vitals History BMI and BSA Data Body Mass Index Body Surface Area  
 34.38 kg/m 2 2.12 m 2 Preferred Pharmacy Pharmacy Name Phone [id number] [address] [facility] [phone] Your Updated Medication List  
  
   
This list is accurate as of: 2/10/17  2:07 PM.  Always use your most recent med list.  
  
  
  
  
 albuterol 90 mcg/actuation inhaler Commonly known as:  PROVENTIL HFA, VENTOLIN HFA, PROAIR HFA Take 2 Puffs by inhalation every four (4) hours as needed for Wheezing. aspirin delayed-release 81 mg tablet Take 1 Tab by mouth daily. atorvastatin 20 mg tablet Commonly known as:  LIPITOR Take 1 Tab by mouth daily. cloNIDine HCl 0.1 mg tablet Commonly known as:  CATAPRES Take 0.1 mg by mouth two (2) times a day. cyclobenzaprine 10 mg tablet Commonly known as:  FLEXERIL Take 1 Tab by mouth three (3) times daily as needed for Muscle Spasm(s). fluticasone-salmeterol 500-50 mcg/dose diskus inhaler Commonly known as:  ADVAIR DISKUS Take 1 Puff by inhalation every twelve (12) hours. furosemide 20 mg tablet Commonly known as:  LASIX Daily  
  
 gabapentin 300 mg capsule Commonly known as:  NEURONTIN Take 1 Cap by mouth three (3) times daily. HumaLOG Mix 75-25 KwikPen 100 unit/mL (75-25) Inpn Generic drug:  Insulin Lisp & Lisp Prot (Hum) 30 Units by SubCUTAneous route two (2) times a day.  
  
 hydrOXYzine HCl 25 mg tablet Commonly known as:  ATARAX Take 25 mg by mouth three (3) times daily as needed for Itching. insulin glargine 100 unit/mL injection Commonly known as:  LANTUS  
40 Units by SubCUTAneous route nightly. losartan-hydroCHLOROthiazide 100-12.5 mg per tablet Commonly known as:  HYZAAR Take 1 Tab by mouth daily. metFORMIN 1,000 mg tablet Commonly known as:  GLUCOPHAGE Take 1 Tab by mouth two (2) times daily (with meals). metoprolol succinate 100 mg tablet Commonly known as:  TOPROL-XL Take 1 Tab by mouth daily. * NIFEdipine ER 60 mg ER tablet Commonly known as:  ADALAT CC Take 1 Tab by mouth daily. * NIFEDICAL XL 60 mg ER tablet Generic drug:  NIFEdipine ER  
take 1 tablet by mouth once daily  
  
 nitroglycerin 0.4 mg SL tablet Commonly known as:  NITROSTAT  
1 Tab by SubLINGual route as needed for Chest Pain. NovoLOG 100 unit/mL injection Generic drug:  insulin aspart 30 Units by SubCUTAneous route Before breakfast, lunch, and dinner. omeprazole 40 mg capsule Commonly known as:  PRILOSEC Take 1 Cap by mouth daily. oxyCODONE-acetaminophen  mg per tablet Commonly known as:  PERCOCET 10 Take 1 Tab by mouth every six (6) hours as needed for Pain. Max Daily Amount: 4 Tabs. Indications: Pain Start taking on:  2/14/2017  
  
 sertraline 50 mg tablet Commonly known as:  ZOLOFT Take 1 Tab by mouth daily. tiotropium bromide 2.5 mcg/actuation inhaler Commonly known as:  Bubba Founds Take 2 Puffs by inhalation daily. traZODone 100 mg tablet Commonly known as:  Tenzin Brumley Take 100 mg by mouth nightly. triamcinolone acetonide 40 mg/mL injection Commonly known as:  KENALOG  
1 mL by IntraMUSCular route once for 1 dose. * Notice: This list has 2 medication(s) that are the same as other medications prescribed for you. Read the directions carefully, and ask your doctor or other care provider to review them with you. Prescriptions Printed Refills  
 oxyCODONE-acetaminophen (PERCOCET 10)  mg per tablet 0 Starting on: 2/14/2017 Sig: Take 1 Tab by mouth every six (6) hours as needed for Pain. Max Daily Amount: 4 Tabs. Indications: Pain Class: Print Route: Oral  
  
Prescriptions Sent to Pharmacy Refills gabapentin (NEURONTIN) 300 mg capsule 0 Sig: Take 1 Cap by mouth three (3) times daily. Class: Normal  
 [facility]acy: [facility] [address] [place] Ph #: [phone] Route: Oral  
  
We Performed the Following INJECT TRIGGER POINTS, > 3 [id number] CPT(R)] TRIAMCINOLONE ACETONIDE INJ [ [facility]] Follow-up Instructions Return in about 2 weeks (around 2/24/2017) for pain management. Patient Instructions Chronic Pain: Care Instructions Your Care Instructions Chronic pain is pain that lasts a long time (months or even years) and may or may not have a clear cause. It is different from acute pain, which usually does have a clear causelike an injury or illnessand gets better over time. Chronic pain: 
· Lasts over time but may vary from day to day. · Does not go away despite efforts to end it. · May disrupt your sleep and lead to fatigue. · May cause depression or anxiety. · May make your muscles tense, causing more pain. · Can disrupt your work, hobbies, home life, and relationships with friends and family. Chronic pain is a very real condition. It is not just in your head. Treatment can help and usually includes several methods used together, such as medicines, physical therapy, exercise, and other treatments. Learning how to relax and changing negative thought patterns can also help you cope. Chronic pain is complex. Taking an active role in your treatment will help you better manage your pain. Tell your doctor if you have trouble dealing with your pain. You may have to try several things before you find what works best for you. Follow-up care is a key part of your treatment and safety. Be sure to make and go to all appointments, and call your doctor if you are having problems. Its also a good idea to know your test results and keep a list of the medicines you take. How can you care for yourself at home? · Pace yourself. Break up large jobs into smaller tasks. Save harder tasks for days when you have less pain, or go back and forth between hard tasks and easier ones. Take rest breaks. · Relax, and reduce stress. Relaxation techniques such as deep breathing or meditation can help. · Keep moving. Gentle, daily exercise can help reduce pain over the long run. Try low- or no-impact exercises such as walking, swimming, and stationary biking. Do stretches to stay flexible. · Try heat, cold packs, and massage. · Get enough sleep. Chronic pain can make you tired and drain your energy. Talk with your doctor if you have trouble sleeping because of pain. · Think positive. Your thoughts can affect your pain level. Do things that you enjoy to distract yourself when you have pain instead of focusing on the pain. See a movie, read a book, listen to music, or spend time with a friend. · If you think you are depressed, talk to your doctor about treatment. · Keep a daily pain diary. Record how your moods, thoughts, sleep patterns, activities, and medicine affect your pain. You may find that your pain is worse during or after certain activities or when you are feeling a certain emotion. Having a record of your pain can help you and your doctor find the best ways to treat your pain. · Take pain medicines exactly as directed. ¨ If the doctor gave you a prescription medicine for pain, take it as prescribed. ¨ If you are not taking a prescription pain medicine, ask your doctor if you can take an over-the-counter medicine. Reducing constipation caused by pain medicine · Include fruits, vegetables, beans, and whole grains in your diet each day. These foods are high in fiber. · Drink plenty of fluids, enough so that your urine is light yellow or clear like water. If you have kidney, heart, or liver disease and have to limit fluids, talk with your doctor before you increase the amount of fluids you drink. · If your doctor recommends it, get more exercise. Walking is a good choice. Bit by bit, increase the amount you walk every day. Try for at least 30 minutes on most days of the week. · Schedule time each day for a bowel movement. A daily routine may help. Take your time and do not strain when having a bowel movement. When should you call for help? Call your doctor now or seek immediate medical care if: 
· Your pain gets worse or is out of control. · You feel down or blue, or you do not enjoy things like you once did. You may be depressed, which is common in people with chronic pain. Depression can be treated. · You have vomiting or cramps for more than 2 hours. Watch closely for changes in your health, and be sure to contact your doctor if: 
· You cannot sleep because of pain. · You are very worried or anxious about your pain. · You have trouble taking your pain medicine. · You have any concerns about your pain medicine. · You have trouble with bowel movements, such as: 
¨ No bowel movement in 3 days. ¨ Blood in the anal area, in your stool, or on the toilet paper. ¨ Diarrhea for more than 24 hours. Where can you learn more? Go to http://shahnaz-martin.info/. Enter N004 in the search box to learn more about \"Chronic Pain: Care Instructions. \" Current as of: February 19, 2016 Content Version: 11.1 © 3243-1623 [employer]. Care instructions adapted under license by [employer] (which disclaims liability or warranty for this information). If you have questions about a medical condition or this instruction, always ask your healthcare professional. [place] [address] any warranty or liability for your use of this information. Introducing [facility]! [place] [employer] introduces [employer] patient portal. Now you can access parts of your medical record, email your doctor's office, and request medication refills online. 1. In your internet browser, go to [email or web address] At [email or web address] [email or web address] 2. Click on the First Time User? Click Here link in the Sign In box. You will see the New Member Sign Up page. 3. Enter your Converser Access Code exactly as it appears below. You will not need to use this code after youve completed the sign-up process. If you do not sign up before the expiration date, you must request a new code. · Converser Access Code: [email or web address] Expires: 4/16/2017 12:21 PM 
 
4. Enter the last four digits of your Social Security Number (xxxx) and Date of Birth (mm/dd/yyyy) as indicated and click Submit. You will be taken to the next sign-up page. 5. Create a [employer] Renewablest ID. This will be your Converser login ID and cannot be changed, so think of one that is secure and easy to remember. 6. Create a Converser password. You can change your password at any time. 7. Enter your Password Reset Question and Answer. This can be used at a later time if you forget your password. 8. Enter your e-mail address. You will receive e-mail notification when new information is available in [address] 9. Click Sign Up. You can now view and download portions of your medical record. 10. Click the Download Summary menu link to download a portable copy of your medical information. If you have questions, please visit the Frequently Asked Questions section of the [employer]vers[employer] website. Remember, Converser is NOT to be used for urgent needs. For medical emergencies, dial 911. Now available from your iPhone and Android! Please provide this summary of care documentation to your next provider. Your primary care clinician is listed as Leslie Ortega. If you have any questions after today's visit, please call [phone].

## 2017-02-16 ENCOUNTER — TELEPHONE (OUTPATIENT)
Dept: FAMILY MEDICINE CLINIC | Age: 47
End: 2017-02-16

## 2017-02-16 NOTE — TELEPHONE ENCOUNTER
[facility] called stating that a neighbor of Ms Bjorn Mims called up to the pharmacy with a complaint. He stated that Matthew's neighbor caught her selling her Percocet to kids around the neighborhood and wanted to report it to her doctor. Aida Barrera told the neighbor to report it to her local police so that they can investigate further into it.

## 2017-03-15 ENCOUNTER — OFFICE VISIT (OUTPATIENT)
Dept: FAMILY MEDICINE CLINIC | Age: 47
End: 2017-03-15

## 2017-03-15 VITALS
DIASTOLIC BLOOD PRESSURE: 99 MMHG | BODY MASS INDEX: 37.93 KG/M2 | TEMPERATURE: 96.5 F | HEART RATE: 83 BPM | OXYGEN SATURATION: 100 % | SYSTOLIC BLOOD PRESSURE: 161 MMHG | RESPIRATION RATE: 18 BRPM | WEIGHT: 236 LBS | HEIGHT: 66 IN

## 2017-03-15 VITALS
OXYGEN SATURATION: 100 % | RESPIRATION RATE: 18 BRPM | WEIGHT: 236 LBS | TEMPERATURE: 96.5 F | HEART RATE: 81 BPM | SYSTOLIC BLOOD PRESSURE: 161 MMHG | BODY MASS INDEX: 37.93 KG/M2 | DIASTOLIC BLOOD PRESSURE: 99 MMHG | HEIGHT: 66 IN

## 2017-03-15 DIAGNOSIS — G89.4 CHRONIC PAIN SYNDROME: ICD-10-CM

## 2017-03-15 DIAGNOSIS — F41.8 DEPRESSION WITH ANXIETY: Primary | ICD-10-CM

## 2017-03-15 RX ORDER — TRAZODONE HYDROCHLORIDE 100 MG/1
100 TABLET ORAL
Qty: 30 TAB | Refills: 1 | Status: SHIPPED | OUTPATIENT
Start: 2017-03-15 | End: 2017-05-31 | Stop reason: SDUPTHER

## 2017-03-15 RX ORDER — METHOCARBAMOL 500 MG/1
500 TABLET, FILM COATED ORAL 4 TIMES DAILY
Qty: 40 TAB | Refills: 0 | Status: SHIPPED | OUTPATIENT
Start: 2017-03-15 | End: 2017-05-31 | Stop reason: ALTCHOICE

## 2017-03-15 RX ORDER — GABAPENTIN 300 MG/1
600 CAPSULE ORAL 3 TIMES DAILY
Qty: 180 CAP | Refills: 0 | Status: SHIPPED | OUTPATIENT
Start: 2017-03-15 | End: 2017-05-31 | Stop reason: SDUPTHER

## 2017-03-15 NOTE — PATIENT INSTRUCTIONS

## 2017-03-15 NOTE — MR AVS SNAPSHOT
Visit Information Date & Time Provider Department Dept. Phone Encounter #  
 3/15/2017 11:00 AM Viral Arellano, [address] [facility] [phone] [id number] Follow-up Instructions Return in about 1 month (around 4/15/2017) for medication evaluation with Heather. Routing History Upcoming Health Maintenance Date Due  
 LIPID PANEL Q1 1972 FOOT EXAM Q1 1/19/1982 MICROALBUMIN Q1 1/19/1982 EYE EXAM RETINAL OR DILATED Q1 1/19/1982 Pneumococcal 19-64 Medium Risk (1 of 1 - PPSV23) 1/19/1991 DTaP/Tdap/Td series (1 - Tdap) 1/19/1993 PAP AKA CERVICAL CYTOLOGY 1/19/1993 HEMOGLOBIN A1C Q6M 6/2/2017 Allergies as of 3/15/2017  Review Complete On: 3/15/2017 By: Viral Arellano MD  
  
 Severity Noted Reaction Type Reactions Carroll Juice High 12/03/2016    Anaphylaxis Pcn [Penicillins]  09/25/2016    Rash Current Immunizations  Reviewed on 12/3/2016 Name Date Influenza Vaccine 10/1/2016 Not reviewed this visit You Were Diagnosed With   
  
 Codes Comments Depression with anxiety    -  Primary ICD-10-CM: F41.8 ICD-9-CM: 300.4 Chronic pain syndrome     ICD-10-CM: G89.4 ICD-9-CM: 338. 4 Vitals BP Pulse Temp Resp Height(growth percentile) Weight(growth percentile) (!) 161/99 (BP 1 Location: Left arm, BP Patient Position: Sitting) 81 96.5 °F (35.8 °C) (Oral) 18 5' 6\" (1.676 m) 236 lb (107 kg) SpO2 BMI OB Status Smoking Status 100% 38.09 kg/m2 Having regular periods Current Every Day Smoker Vitals History BMI and BSA Data Body Mass Index Body Surface Area 38.09 kg/m 2 2.23 m 2 Preferred Pharmacy Pharmacy Name Phone [phone] [facility] [phone] Your Updated Medication List  
  
   
This list is accurate as of: 3/15/17 11:21 AM.  Always use your most recent med list.  
  
  
  
  
 albuterol 90 mcg/actuation inhaler Commonly known as:  PROVENTIL HFA, VENTOLIN HFA, PROAIR HFA Take 2 Puffs by inhalation every four (4) hours as needed for Wheezing. aspirin delayed-release 81 mg tablet Take 1 Tab by mouth daily. atorvastatin 20 mg tablet Commonly known as:  LIPITOR Take 1 Tab by mouth daily. cloNIDine HCl 0.1 mg tablet Commonly known as:  CATAPRES Take 0.1 mg by mouth two (2) times a day. fluticasone-salmeterol 500-50 mcg/dose diskus inhaler Commonly known as:  ADVAIR DISKUS Take 1 Puff by inhalation every twelve (12) hours. furosemide 20 mg tablet Commonly known as:  LASIX Daily  
  
 gabapentin 300 mg capsule Commonly known as:  NEURONTIN Take 2 Caps by mouth three (3) times daily. HumaLOG Mix 75-25 KwikPen 100 unit/mL (75-25) Inpn Generic drug:  Insulin Lisp & Lisp Prot (Hum) 30 Units by SubCUTAneous route two (2) times a day.  
  
 hydrOXYzine HCl 25 mg tablet Commonly known as:  ATARAX Take 25 mg by mouth three (3) times daily as needed for Itching. insulin glargine 100 unit/mL injection Commonly known as:  LANTUS  
40 Units by SubCUTAneous route nightly. losartan-hydroCHLOROthiazide 100-12.5 mg per tablet Commonly known as:  HYZAAR Take 1 Tab by mouth daily. metFORMIN 1,000 mg tablet Commonly known as:  GLUCOPHAGE Take 1 Tab by mouth two (2) times daily (with meals). methocarbamol 500 mg tablet Commonly known as:  ROBAXIN Take 1 Tab by mouth four (4) times daily. metoprolol succinate 100 mg tablet Commonly known as:  TOPROL-XL Take 1 Tab by mouth daily. * NIFEdipine ER 60 mg ER tablet Commonly known as:  ADALAT CC Take 1 Tab by mouth daily. * NIFEDICAL XL 60 mg ER tablet Generic drug:  NIFEdipine ER  
take 1 tablet by mouth once daily  
  
 nitroglycerin 0.4 mg SL tablet Commonly known as:  NITROSTAT  
1 Tab by SubLINGual route as needed for Chest Pain. NovoLOG 100 unit/mL injection Generic drug:  insulin aspart 30 Units by SubCUTAneous route Before breakfast, lunch, and dinner. omeprazole 40 mg capsule Commonly known as:  PRILOSEC Take 1 Cap by mouth daily. oxyCODONE-acetaminophen  mg per tablet Commonly known as:  PERCOCET 10 Take 1 Tab by mouth every six (6) hours as needed for Pain. Max Daily Amount: 4 Tabs. Indications: Pain  
  
 sertraline 50 mg tablet Commonly known as:  ZOLOFT Take 1 Tab by mouth daily. tiotropium bromide 2.5 mcg/actuation inhaler Commonly known as:  Ashok Cedars Take 2 Puffs by inhalation daily. traZODone 100 mg tablet Commonly known as:  Radha Au Take 1 Tab by mouth nightly. * Notice: This list has 2 medication(s) that are the same as other medications prescribed for you. Read the directions carefully, and ask your doctor or other care provider to review them with you. Prescriptions Sent to Pharmacy Refills  
 gabapentin (NEURONTIN) 300 mg capsule 0 Sig: Take 2 Caps by mouth three (3) times daily. Class: Normal  
 Pharmacy: [facility] [address], [place] Ph #: [phone] Route: Oral  
 traZODone (DESYREL) 100 mg tablet 1 Sig: Take 1 Tab by mouth nightly. Class: Normal  
 Pharmacy: [facility] [address] [place] Ph #: [phone] Route: Oral  
 methocarbamol (ROBAXIN) 500 mg tablet 0 Sig: Take 1 Tab by mouth four (4) times daily. Class: Normal  
 Pharmacy: [facility] [address] [place] Ph #: [phone] Route: Oral  
  
We Performed the Following REFERRAL TO PSYCHIATRY [REF91 Custom] Comments:  
 Please evaluate 40 y/o female patient for depression and anxiety. Follow-up Instructions Return in about 1 month (around 4/15/2017) for medication evaluation with Heather. Referral Information Referral ID Referred By Referred To  
  
 [id number] Antonio MCLAUGHLIN MD   
   [id number] [facility] [address] Eli[address]ah[address] Phone: [phone] Fax: [phone] Visits Status Start Date End Date 1 New Request 3/15/17 3/15/18 If your referral has a status of pending review or denied, additional information will be sent to support the outcome of this decision. Patient Instructions Chronic Pain: Care Instructions Your Care Instructions Chronic pain is pain that lasts a long time (months or even years) and may or may not have a clear cause. It is different from acute pain, which usually does have a clear causelike an injury or illnessand gets better over time. Chronic pain: 
· Lasts over time but may vary from day to day. · Does not go away despite efforts to end it. · May disrupt your sleep and lead to fatigue. · May cause depression or anxiety. · May make your muscles tense, causing more pain. · Can disrupt your work, hobbies, home life, and relationships with friends and family. Chronic pain is a very real condition. It is not just in your head. Treatment can help and usually includes several methods used together, such as medicines, physical therapy, exercise, and other treatments. Learning how to relax and changing negative thought patterns can also help you cope. Chronic pain is complex. Taking an active role in your treatment will help you better manage your pain. Tell your doctor if you have trouble dealing with your pain. You may have to try several things before you find what works best for you. Follow-up care is a key part of your treatment and safety. Be sure to make and go to all appointments, and call your doctor if you are having problems. Its also a good idea to know your test results and keep a list of the medicines you take. How can you care for yourself at home? · Pace yourself. Break up large jobs into smaller tasks. Save harder tasks for days when you have less pain, or go back and forth between hard tasks and easier ones. Take rest breaks. · Relax, and reduce stress. Relaxation techniques such as deep breathing or meditation can help. · Keep moving. Gentle, daily exercise can help reduce pain over the long run. Try low- or no-impact exercises such as walking, swimming, and stationary biking. Do stretches to stay flexible. · Try heat, cold packs, and massage. · Get enough sleep. Chronic pain can make you tired and drain your energy. Talk with your doctor if you have trouble sleeping because of pain. · Think positive. Your thoughts can affect your pain level. Do things that you enjoy to distract yourself when you have pain instead of focusing on the pain. See a movie, read a book, listen to music, or spend time with a friend. · If you think you are depressed, talk to your doctor about treatment. · Keep a daily pain diary. Record how your moods, thoughts, sleep patterns, activities, and medicine affect your pain. You may find that your pain is worse during or after certain activities or when you are feeling a certain emotion. Having a record of your pain can help you and your doctor find the best ways to treat your pain. · Take pain medicines exactly as directed. ¨ If the doctor gave you a prescription medicine for pain, take it as prescribed. ¨ If you are not taking a prescription pain medicine, ask your doctor if you can take an over-the-counter medicine. Reducing constipation caused by pain medicine · Include fruits, vegetables, beans, and whole grains in your diet each day. These foods are high in fiber. · Drink plenty of fluids, enough so that your urine is light yellow or clear like water. If you have kidney, heart, or liver disease and have to limit fluids, talk with your doctor before you increase the amount of fluids you drink. · If your doctor recommends it, get more exercise. Walking is a good choice. Bit by bit, increase the amount you walk every day. Try for at least 30 minutes on most days of the week. · Schedule time each day for a bowel movement. A daily routine may help. Take your time and do not strain when having a bowel movement. When should you call for help? Call your doctor now or seek immediate medical care if: 
· Your pain gets worse or is out of control. · You feel down or blue, or you do not enjoy things like you once did. You may be depressed, which is common in people with chronic pain. Depression can be treated. · You have vomiting or cramps for more than 2 hours. Watch closely for changes in your health, and be sure to contact your doctor if: 
· You cannot sleep because of pain. · You are very worried or anxious about your pain. · You have trouble taking your pain medicine. · You have any concerns about your pain medicine. · You have trouble with bowel movements, such as: 
¨ No bowel movement in 3 days. ¨ Blood in the anal area, in your stool, or on the toilet paper. ¨ Diarrhea for more than 24 hours. Where can you learn more? Go to http://shahnaz-martin.info/. Enter N004 in the search box to learn more about \"Chronic Pain: Care Instructions. \" Current as of: February 19, 2016 Content Version: 11.1 © 1215-7216 Healthwise, Incorporated. Care instructions adapted under license by [employer] (which disclaims liability or warranty for this information). If you have questions about a medical condition or this instruction, always ask your healthcare professional. [address] any warranty or liability for your use of this information. Introducing \[facility] [place] [facility]\"" & HEALTH SERVICES! Crescencio Rivas introduces [employer] patient portal. Now you can access parts of your medical record, email your doctor's office, and request medication refills online. 1. In your internet browser, go to [email or web address] 2. Click on the First Time User? Click Here link in the Sign In box. You will see the New Member Sign Up page. 3. Enter your Kaggle Access Code exactly as it appears below. You will not need to use this code after youve completed the sign-up process. If you do not sign up before the expiration date, you must request a new code. · Kaggle Access Code: [email or web address] Expires: 4/16/2017  1:21 PM 
 
4. Enter the last four digits of your Social Security Number (xxxx) and Date of Birth (mm/dd/yyyy) as indicated and click Submit. You will be taken to the next sign-up page. 5. Create a FanTrailt ID. This will be your Kaggle login ID and cannot be changed, so think of one that is secure and easy to remember. 6. Create a Kaggle password. You can change your password at any time. 7. Enter your Password Reset Question and Answer. This can be used at a later time if you forget your password. 8. Enter your e-mail address. You will receive e-mail notification when new information is available in [address] 9. Click Sign Up. You can now view and download portions of your medical record. 10. Click the Download Summary menu link to download a portable copy of your medical information. If you have questions, please visit the Frequently Asked Questions section of the Kaggle website. Remember, Kaggle is NOT to be used for urgent needs. For medical emergencies, dial 911. Now available from your iPhone and Android! Please provide this summary of care documentation to your next provider. Your primary care clinician is listed as Leslie Ortega. If you have any questions after today's visit, please call [phone].
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
## 2017-03-15 NOTE — PROGRESS NOTES
Patient presents to clinic for knee and back pain. Advance Directive:    1. Do you have an advance directive in place? Patient Reply:     2. If not, would you like material regarding how to put one in place? Patient Reply:      Coordination of Care:    1. Have you been to the ER, urgent care clinic since your last visit? Hospitalized since your last visit? [facility]     2. Have you seen or consulted any other health care providers outside of the [address] [place] [address] since your last visit? Include any pap smears or colon screening. No     Depression Screening completed. Learning Assessment completed. Abuse Screening completed. Health Maintenance reviewed and discussed per provider.

## 2017-03-21 ENCOUNTER — TELEPHONE (OUTPATIENT)
Dept: FAMILY MEDICINE CLINIC | Age: 47
End: 2017-03-21
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
## 2017-03-30 DIAGNOSIS — J44.9 CHRONIC OBSTRUCTIVE PULMONARY DISEASE, UNSPECIFIED COPD TYPE (HCC): ICD-10-CM

## 2017-03-30 RX ORDER — ALBUTEROL SULFATE 108 UG/1
AEROSOL, METERED RESPIRATORY (INHALATION)
Qty: 6.7 INHALER | Refills: 0 | OUTPATIENT
Start: 2017-03-30

## 2017-03-30 RX ORDER — ALBUTEROL SULFATE 90 UG/1
2 AEROSOL, METERED RESPIRATORY (INHALATION)
Qty: 1 INHALER | Refills: 5 | Status: SHIPPED | OUTPATIENT
Start: 2017-03-30 | End: 2017-05-31 | Stop reason: SDUPTHER

## 2017-05-31 ENCOUNTER — OFFICE VISIT (OUTPATIENT)
Dept: FAMILY MEDICINE CLINIC | Age: 47
End: 2017-05-31

## 2017-05-31 VITALS
HEIGHT: 66 IN | RESPIRATION RATE: 19 BRPM | OXYGEN SATURATION: 99 % | HEART RATE: 93 BPM | SYSTOLIC BLOOD PRESSURE: 155 MMHG | WEIGHT: 243 LBS | DIASTOLIC BLOOD PRESSURE: 106 MMHG | BODY MASS INDEX: 39.05 KG/M2 | TEMPERATURE: 97.3 F

## 2017-05-31 DIAGNOSIS — I10 ESSENTIAL HYPERTENSION: ICD-10-CM

## 2017-05-31 DIAGNOSIS — E78.5 HYPERLIPIDEMIA LDL GOAL <100: ICD-10-CM

## 2017-05-31 DIAGNOSIS — E78.5 HYPERLIPIDEMIA, UNSPECIFIED HYPERLIPIDEMIA TYPE: ICD-10-CM

## 2017-05-31 DIAGNOSIS — E11.42 TYPE 2 DIABETES MELLITUS WITH DIABETIC POLYNEUROPATHY, WITH LONG-TERM CURRENT USE OF INSULIN (HCC): Primary | ICD-10-CM

## 2017-05-31 DIAGNOSIS — Z79.4 TYPE 2 DIABETES MELLITUS WITH DIABETIC POLYNEUROPATHY, WITH LONG-TERM CURRENT USE OF INSULIN (HCC): Primary | ICD-10-CM

## 2017-05-31 DIAGNOSIS — I50.22 CHRONIC SYSTOLIC CONGESTIVE HEART FAILURE (HCC): ICD-10-CM

## 2017-05-31 DIAGNOSIS — F32.A ANXIETY AND DEPRESSION: ICD-10-CM

## 2017-05-31 DIAGNOSIS — I25.10 CORONARY ARTERY DISEASE INVOLVING NATIVE CORONARY ARTERY OF NATIVE HEART WITHOUT ANGINA PECTORIS: ICD-10-CM

## 2017-05-31 DIAGNOSIS — J44.9 CHRONIC OBSTRUCTIVE PULMONARY DISEASE, UNSPECIFIED COPD TYPE (HCC): ICD-10-CM

## 2017-05-31 DIAGNOSIS — K21.00 GASTROESOPHAGEAL REFLUX DISEASE WITH ESOPHAGITIS: ICD-10-CM

## 2017-05-31 DIAGNOSIS — G89.4 CHRONIC PAIN SYNDROME: ICD-10-CM

## 2017-05-31 DIAGNOSIS — F41.9 ANXIETY AND DEPRESSION: ICD-10-CM

## 2017-05-31 DIAGNOSIS — K21.9 GASTROESOPHAGEAL REFLUX DISEASE WITHOUT ESOPHAGITIS: Chronic | ICD-10-CM

## 2017-05-31 DIAGNOSIS — N17.9 AKI (ACUTE KIDNEY INJURY) (HCC): ICD-10-CM

## 2017-05-31 DIAGNOSIS — F41.8 DEPRESSION WITH ANXIETY: ICD-10-CM

## 2017-05-31 RX ORDER — LOSARTAN POTASSIUM AND HYDROCHLOROTHIAZIDE 12.5; 1 MG/1; MG/1
1 TABLET ORAL DAILY
Qty: 30 TAB | Refills: 3 | Status: SHIPPED | OUTPATIENT
Start: 2017-05-31

## 2017-05-31 RX ORDER — OMEPRAZOLE 40 MG/1
40 CAPSULE, DELAYED RELEASE ORAL DAILY
Qty: 30 CAP | Refills: 3 | Status: SHIPPED | OUTPATIENT
Start: 2017-05-31 | End: 2017-08-30 | Stop reason: SDUPTHER

## 2017-05-31 RX ORDER — NITROGLYCERIN 0.4 MG/1
0.4 TABLET SUBLINGUAL AS NEEDED
Qty: 1 BOTTLE | Refills: 1 | Status: SHIPPED | OUTPATIENT
Start: 2017-05-31

## 2017-05-31 RX ORDER — METOPROLOL SUCCINATE 100 MG/1
100 TABLET, EXTENDED RELEASE ORAL DAILY
Qty: 30 TAB | Refills: 3 | Status: SHIPPED | OUTPATIENT
Start: 2017-05-31

## 2017-05-31 RX ORDER — FUROSEMIDE 20 MG/1
TABLET ORAL
Qty: 30 TAB | Refills: 5 | Status: SHIPPED | OUTPATIENT
Start: 2017-05-31

## 2017-05-31 RX ORDER — NAPROXEN 500 MG/1
500 TABLET ORAL 2 TIMES DAILY WITH MEALS
Qty: 100 TAB | Refills: 0 | Status: SHIPPED | OUTPATIENT
Start: 2017-05-31 | End: 2017-06-27

## 2017-05-31 RX ORDER — INSULIN GLARGINE 100 [IU]/ML
40 INJECTION, SOLUTION SUBCUTANEOUS
Qty: 1 VIAL | Refills: 3
Start: 2017-05-31 | End: 2017-08-30 | Stop reason: SDUPTHER

## 2017-05-31 RX ORDER — INSULIN ASPART 100 [IU]/ML
25 INJECTION, SOLUTION INTRAVENOUS; SUBCUTANEOUS
Qty: 10 ML | Refills: 2 | Status: SHIPPED | OUTPATIENT
Start: 2017-05-31 | End: 2017-08-30 | Stop reason: SDUPTHER

## 2017-05-31 RX ORDER — ALBUTEROL SULFATE 90 UG/1
2 AEROSOL, METERED RESPIRATORY (INHALATION)
Qty: 1 INHALER | Refills: 5 | Status: SHIPPED | OUTPATIENT
Start: 2017-05-31

## 2017-05-31 RX ORDER — FLUTICASONE PROPIONATE AND SALMETEROL 500; 50 UG/1; UG/1
1 POWDER RESPIRATORY (INHALATION) EVERY 12 HOURS
Qty: 1 INHALER | Refills: 5 | Status: SHIPPED | OUTPATIENT
Start: 2017-05-31 | End: 2017-08-07

## 2017-05-31 RX ORDER — ASPIRIN 81 MG/1
81 TABLET ORAL DAILY
Qty: 100 TAB | Refills: 5 | Status: SHIPPED | OUTPATIENT
Start: 2017-05-31

## 2017-05-31 RX ORDER — LANCETS
EACH MISCELLANEOUS
Qty: 200 EACH | Refills: 5 | Status: SHIPPED | OUTPATIENT
Start: 2017-05-31 | End: 2017-08-30 | Stop reason: SDUPTHER

## 2017-05-31 RX ORDER — GABAPENTIN 300 MG/1
600 CAPSULE ORAL 3 TIMES DAILY
Qty: 180 CAP | Refills: 5 | Status: SHIPPED | OUTPATIENT
Start: 2017-05-31

## 2017-05-31 RX ORDER — CLONIDINE HYDROCHLORIDE 0.2 MG/1
0.2 TABLET ORAL 2 TIMES DAILY
Qty: 60 TAB | Refills: 2 | Status: SHIPPED | OUTPATIENT
Start: 2017-05-31

## 2017-05-31 RX ORDER — METFORMIN HYDROCHLORIDE 1000 MG/1
1000 TABLET ORAL 2 TIMES DAILY WITH MEALS
Qty: 60 TAB | Refills: 3 | Status: SHIPPED | OUTPATIENT
Start: 2017-05-31 | End: 2017-08-30 | Stop reason: SDUPTHER

## 2017-05-31 RX ORDER — SERTRALINE HYDROCHLORIDE 50 MG/1
50 TABLET, FILM COATED ORAL DAILY
Qty: 30 TAB | Refills: 2 | Status: SHIPPED | OUTPATIENT
Start: 2017-05-31 | End: 2017-08-07

## 2017-05-31 RX ORDER — TRAZODONE HYDROCHLORIDE 100 MG/1
100 TABLET ORAL
Qty: 30 TAB | Refills: 2 | Status: SHIPPED | OUTPATIENT
Start: 2017-05-31

## 2017-05-31 RX ORDER — INSULIN PUMP SYRINGE, 3 ML
EACH MISCELLANEOUS
Qty: 1 KIT | Refills: 0 | Status: SHIPPED | OUTPATIENT
Start: 2017-05-31 | End: 2017-08-30 | Stop reason: SDUPTHER

## 2017-05-31 RX ORDER — ATORVASTATIN CALCIUM 20 MG/1
20 TABLET, FILM COATED ORAL DAILY
Qty: 30 TAB | Refills: 5 | Status: SHIPPED | OUTPATIENT
Start: 2017-05-31 | End: 2017-08-30 | Stop reason: SDUPTHER

## 2017-05-31 NOTE — MR AVS SNAPSHOT
Visit Information Date & Time Provider Department Dept. Phone Encounter #  
 5/31/2017  1:45 PM Hunter Coates, [address]  Follow-up Instructions Return in about 4 weeks (around 6/28/2017) for dm, htn, hld. Your Appointments 5/31/2017  1:45 PM  
Office Visit with Hunter Coates MD  
[address] Appt Note: Return in about 3 months (around 4/30/2017), or if symptoms worsen or fail to improve, for f/u in 3 months for hypertension, high cholesterol.; Rescheduling Appointment From 04/28/2017  
 [address] [facility] [address]  
  
   
 [address] [place] [address] [address] Upcoming Health Maintenance Date Due  
 LIPID PANEL Q1 1972 FOOT EXAM Q1 1/19/1982 MICROALBUMIN Q1 1/19/1982 EYE EXAM RETINAL OR DILATED Q1 1/19/1982 Pneumococcal 19-64 Medium Risk (1 of 1 - PPSV23) 1/19/1991 DTaP/Tdap/Td series (1 - Tdap) 1/19/1993 PAP AKA CERVICAL CYTOLOGY 1/19/1993 HEMOGLOBIN A1C Q6M 6/2/2017 INFLUENZA AGE 9 TO ADULT 8/1/2017 Allergies as of 5/31/2017  Review Complete On: 5/31/2017 By: Hunter Coates MD  
  
 Severity Noted Reaction Type Reactions [place] Juice High 12/03/2016    Anaphylaxis Pcn [Penicillins]  09/25/2016    Rash Current Immunizations  Reviewed on 12/3/2016 Name Date Influenza Vaccine 10/1/2016 Not reviewed this visit You Were Diagnosed With   
  
 Codes Comments Type 2 diabetes mellitus with diabetic polyneuropathy, with long-term current use of insulin (HCC)    -  Primary ICD-10-CM: E11.42, Z79.4 ICD-9-CM: 250.60, 357.2, V58.67 Chronic systolic congestive heart failure (HCC)     ICD-10-CM: I50.22 ICD-9-CM: 428.22, 428.0 Essential hypertension     ICD-10-CM: I10 
ICD-9-CM: 401.9 Hyperlipidemia, unspecified hyperlipidemia type     ICD-10-CM: E78.5 ICD-9-CM: 272.4 Gastroesophageal reflux disease without esophagitis     ICD-10-CM: K21.9 ICD-9-CM: 530.81 PRETTY (acute kidney injury) ([facility]     ICD-10-CM: N17.9 ICD-9-CM: 548. 9 Chronic obstructive pulmonary disease, unspecified COPD type ([facility]     ICD-10-CM: J44.9 ICD-9-CM: 412 Chronic pain syndrome     ICD-10-CM: G89.4 ICD-9-CM: 338.4 Anxiety and depression     ICD-10-CM: F41.9, F32.9 ICD-9-CM: 300.00, 311 Gastroesophageal reflux disease with esophagitis     ICD-10-CM: K21.0 ICD-9-CM: 530.11 Hyperlipidemia LDL goal <100     ICD-10-CM: E78.5 ICD-9-CM: 272.4 Depression with anxiety     ICD-10-CM: F41.8 ICD-9-CM: 300.4 Coronary artery disease involving native coronary artery of native heart without angina pectoris     ICD-10-CM: I25.10 ICD-9-CM: 414.01 Vitals BP Pulse Temp Resp Height(growth percentile) Weight(growth percentile) (!) 155/106 (BP 1 Location: Right arm, BP Patient Position: Sitting) 93 97.3 °F (36.3 °C) (Oral) 19 5' 6\" (1.676 m) 243 lb (110.2 kg) SpO2 BMI OB Status Smoking Status 99% 39.22 kg/m2 Having regular periods Current Every Day Smoker Vitals History BMI and BSA Data Body Mass Index Body Surface Area  
 39.22 kg/m 2 2.27 m 2 Preferred Pharmacy Pharmacy Name Phone [facility] [id number] [facility] [phone] Your Updated Medication List  
  
   
This list is accurate as of: 5/31/17 12:53 PM.  Always use your most recent med list.  
  
  
  
  
 albuterol 90 mcg/actuation inhaler Commonly known as:  PROVENTIL HFA, VENTOLIN HFA, PROAIR HFA Take 2 Puffs by inhalation every four (4) hours as needed for Wheezing. aspirin delayed-release 81 mg tablet Take 1 Tab by mouth daily. atorvastatin 20 mg tablet Commonly known as:  LIPITOR Take 1 Tab by mouth daily. Blood-Glucose Meter monitoring kit Use as directed  
  
 cloNIDine HCl 0.2 mg tablet Commonly known as:  CATAPRES Take 1 Tab by mouth two (2) times a day. fluticasone-salmeterol 500-50 mcg/dose diskus inhaler Commonly known as:  ADVAIR DISKUS Take 1 Puff by inhalation every twelve (12) hours. furosemide 20 mg tablet Commonly known as:  LASIX Daily  
  
 gabapentin 300 mg capsule Commonly known as:  NEURONTIN Take 2 Caps by mouth three (3) times daily. glucose blood VI test strips strip Commonly known as:  blood glucose test  
Use as directed  
  
 hydrOXYzine HCl 25 mg tablet Commonly known as:  ATARAX Take 25 mg by mouth three (3) times daily as needed for Itching. insulin aspart 100 unit/mL injection Commonly known as:  NovoLOG  
25 Units by SubCUTAneous route Before breakfast, lunch, and dinner. insulin glargine 100 unit/mL injection Commonly known as:  LANTUS  
40 Units by SubCUTAneous route nightly. Lancets Misc Use as directed  
  
 losartan-hydroCHLOROthiazide 100-12.5 mg per tablet Commonly known as:  HYZAAR Take 1 Tab by mouth daily. metFORMIN 1,000 mg tablet Commonly known as:  GLUCOPHAGE Take 1 Tab by mouth two (2) times daily (with meals). metoprolol succinate 100 mg tablet Commonly known as:  TOPROL-XL Take 1 Tab by mouth daily. NIFEDICAL XL 60 mg ER tablet Generic drug:  NIFEdipine ER  
take 1 tablet by mouth once daily  
  
 nitroglycerin 0.4 mg SL tablet Commonly known as:  NITROSTAT  
1 Tab by SubLINGual route as needed for Chest Pain. omeprazole 40 mg capsule Commonly known as:  PRILOSEC Take 1 Cap by mouth daily. sertraline 50 mg tablet Commonly known as:  ZOLOFT Take 1 Tab by mouth daily. tiotropium bromide 2.5 mcg/actuation inhaler Commonly known as:  Rex Pagoda Take 2 Puffs by inhalation daily. traZODone 100 mg tablet Commonly known as:  Remington Mallet Take 1 Tab by mouth nightly. Prescriptions Sent to Pharmacy Refills Blood-Glucose Meter monitoring kit 0 Sig: Use as directed Class: Normal  
 Pharmacy: [facility] [address] #: [phone]  
 glucose blood VI test strips (BLOOD GLUCOSE TEST) strip 5 Sig: Use as directed Class: Normal  
 [facility] [place] Ph #: [phone] Lancets misc 5 Sig: Use as directed Class: Normal  
 Pharmacy: [facility] [address] #: [phone]  
 cloNIDine HCl (CATAPRES) 0.2 mg tablet 2 Sig: Take 1 Tab by mouth two (2) times a day. Class: Normal  
 [facility]: [facility] [address] [place] Ph #: [phone] Route: Oral  
 gabapentin (NEURONTIN) 300 mg capsule 5 Sig: Take 2 Caps by mouth three (3) times daily. Class: Normal  
 [facility] [place] Ph #: [phone] Route: Oral  
 sertraline (ZOLOFT) 50 mg tablet 2 Sig: Take 1 Tab by mouth daily. Class: Normal  
 [facility]: [facility] [place] Ph #: [phone] Route: Oral  
 albuterol (PROVENTIL HFA, VENTOLIN HFA, PROAIR HFA) 90 mcg/actuation inhaler 5 Sig: Take 2 Puffs by inhalation every four (4) hours as needed for Wheezing. Class: Normal  
 [facility]: [facility] [place] Ph #: [phone] Route: Inhalation  
 tiotropium bromide (SPIRIVA RESPIMAT) 2.5 mcg/actuation inhaler 5 Sig: Take 2 Puffs by inhalation daily. Class: Normal  
 [facility]: [facility] [place] Ph #: [phone] Route: Inhalation  
 furosemide (LASIX) 20 mg tablet 5 Sig: Daily Class: Normal  
 [facility]: [facility] [address] [place] [address] #: [phone] omeprazole (PRILOSEC) 40 mg capsule 3 Sig: Take 1 Cap by mouth daily. Class: Normal  
 [facility] [phone] Route: Oral  
 metFORMIN (GLUCOPHAGE) 1,000 mg tablet 3 Sig: Take 1 Tab by mouth two (2) times daily (with meals). Class: Normal  
 [facility] [phone] Route: Oral  
 aspirin delayed-release 81 mg tablet 5 Sig: Take 1 Tab by mouth daily. Class: Normal  
 [facility]: [phone] Route: Oral  
 insulin aspart (NOVOLOG) 100 unit/mL injection 2 Si Units by SubCUTAneous route Before breakfast, lunch, and dinner. Class: Normal  
 [facility]: [phone] Route: SubCUTAneous  
 fluticasone-salmeterol (ADVAIR DISKUS) 500-50 mcg/dose diskus inhaler 5 Sig: Take 1 Puff by inhalation every twelve (12) hours. Class: Normal  
 [facility] #: [phone] Route: Inhalation  
 atorvastatin (LIPITOR) 20 mg tablet 5 Sig: Take 1 Tab by mouth daily. Class: Normal  
 [facility]: [phone] Route: Oral  
 metoprolol succinate (TOPROL-XL) 100 mg tablet 3 Sig: Take 1 Tab by mouth daily. Class: Normal  
 [facility] [phone] Route: Oral  
 losartan-hydroCHLOROthiazide (HYZAAR) 100-12.5 mg per tablet 3 Sig: Take 1 Tab by mouth daily. Class: Normal  
 [facility] [phone]  Route: Oral  
 traZODone (DESYREL) 100 mg tablet 2  
 Sig: Take 1 Tab by mouth nightly. Class: Normal  
 [facility]: [facility] [place]  #: [phone] Route: Oral  
 nitroglycerin (NITROSTAT) 0.4 mg SL tablet 1 Si Tab by SubLINGual route as needed for Chest Pain. Class: Normal  
 [facility]: [facility] [place]  #: [phone] Route: SubLINGual  
  
Follow-up Instructions Return in about 4 weeks (around 2017) for dm, htn, hld. To-Do List   
 2017 ECHO:  2D ECHO COMPLETE ADULT (TTE) W OR WO CONTR   
  
 2017 Lab:  HEMOGLOBIN A1C WITH EAG   
  
 2017 Lab:  LIPID PANEL   
  
 2017 Lab:  METABOLIC PANEL, COMPREHENSIVE   
  
 2017 Lab:  MICROALBUMIN, UR, RAND W/ MICROALBUMIN/CREA RATIO Introducing [facility]! Mouna Brand introduces Begun patient portal. Now you can access parts of your medical record, email your doctor's office, and request medication refills online. 1. In your internet browser, go to [email or web address] 2. Click on the First Time User? Click Here link in the Sign In box. You will see the New Member Sign Up page. 3. Enter your Begun Access Code exactly as it appears below. You will not need to use this code after youve completed the sign-up process. If you do not sign up before the expiration date, you must request a new code. · Begun Access Code: [email or web address] Expires: 2017  1:36 PM 
 
4. Enter the last four digits of your Social Security Number (xxxx) and Date of Birth (mm/dd/yyyy) as indicated and click Submit. You will be taken to the next sign-up page. 5. Create a Begun ID. This will be your Begun login ID and cannot be changed, so think of one that is secure and easy to remember. 6. Create a Begun password. You can change your password at any time. 7. Enter your Password Reset Question and Answer. This can be used at a later time if you forget your password. 8. Enter your e-mail address. You will receive e-mail notification when new information is available in [address] 9. Click Sign Up. You can now view and download portions of your medical record. 10. Click the Download Summary menu link to download a portable copy of your medical information. If you have questions, please visit the Frequently Asked Questions section of the Pod[employer] Ready website. Remember, [employer] Ready is NOT to be used for urgent needs. For medical emergencies, dial 911. Now available from your iPhone and Android! Please provide this summary of care documentation to your next provider. Your primary care clinician is listed as Bernardo Fernández. If you have any questions after today's visit, please call [phone].
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
## 2017-05-31 NOTE — PROGRESS NOTES
Patient presents to clinic for follow up and medication refills. Advance Directive:    1. Do you have an advance directive in place? Patient Reply:     2. If not, would you like material regarding how to put one in place? Patient Reply:      Coordination of Care:    1. Have you been to the ER, urgent care clinic since your last visit? Hospitalized since your last visit? No     2. Have you seen or consulted any other health care providers outside of the [address] [place] [address] since your last visit? Include any pap smears or colon screening. No    Depression Screening completed. Learning Assessment completed. Abuse Screening completed. Health Maintenance reviewed and discussed per provider.

## 2017-06-27 ENCOUNTER — HOSPITAL ENCOUNTER (OUTPATIENT)
Dept: LAB | Age: 47
Discharge: HOME | End: 2017-06-27
Payer: MEDICARE

## 2017-06-27 ENCOUNTER — OFFICE VISIT (OUTPATIENT)
Dept: FAMILY MEDICINE CLINIC | Age: 47
End: 2017-06-27

## 2017-06-27 VITALS
DIASTOLIC BLOOD PRESSURE: 100 MMHG | TEMPERATURE: 97.7 F | HEIGHT: 66 IN | HEART RATE: 84 BPM | SYSTOLIC BLOOD PRESSURE: 157 MMHG | OXYGEN SATURATION: 96 % | BODY MASS INDEX: 38.41 KG/M2 | WEIGHT: 239 LBS | RESPIRATION RATE: 19 BRPM

## 2017-06-27 DIAGNOSIS — G89.4 CHRONIC PAIN SYNDROME: ICD-10-CM

## 2017-06-27 DIAGNOSIS — M79.672 PAIN IN BOTH FEET: ICD-10-CM

## 2017-06-27 DIAGNOSIS — I10 ESSENTIAL HYPERTENSION: Primary | ICD-10-CM

## 2017-06-27 DIAGNOSIS — Z79.4 TYPE 2 DIABETES MELLITUS WITH DIABETIC POLYNEUROPATHY, WITH LONG-TERM CURRENT USE OF INSULIN (HCC): ICD-10-CM

## 2017-06-27 DIAGNOSIS — K11.7 XEROSTOMIA: ICD-10-CM

## 2017-06-27 DIAGNOSIS — E11.42 TYPE 2 DIABETES MELLITUS WITH DIABETIC POLYNEUROPATHY, WITH LONG-TERM CURRENT USE OF INSULIN (HCC): ICD-10-CM

## 2017-06-27 DIAGNOSIS — M79.671 PAIN IN BOTH FEET: ICD-10-CM

## 2017-06-27 DIAGNOSIS — Z79.891 LONG TERM (CURRENT) USE OF OPIATE ANALGESIC: ICD-10-CM

## 2017-06-27 PROCEDURE — 80307 DRUG TEST PRSMV CHEM ANLYZR: CPT | Performed by: INTERNAL MEDICINE

## 2017-06-27 RX ORDER — OXYCODONE AND ACETAMINOPHEN 5; 325 MG/1; MG/1
1 TABLET ORAL
Qty: 10 TAB | Refills: 0 | Status: SHIPPED | OUTPATIENT
Start: 2017-06-27 | End: 2017-08-07

## 2017-06-27 RX ORDER — ACETAMINOPHEN 500 MG
TABLET ORAL
Qty: 1 KIT | Refills: 0 | Status: SHIPPED | OUTPATIENT
Start: 2017-06-27 | End: 2017-08-07 | Stop reason: SDUPTHER

## 2017-06-27 NOTE — MR AVS SNAPSHOT
Visit Information Date & Time Provider Department Dept. Phone Encounter #  
 6/27/2017  3:30 PM Eliana Sotomayor, [address] [phone] [id number] Follow-up Instructions Return in about 2 weeks (around 7/11/2017) for dm, htn. Follow-up and Disposition History Upcoming Health Maintenance Date Due  
 LIPID PANEL Q1 1972 FOOT EXAM Q1 1/19/1982 MICROALBUMIN Q1 1/19/1982 EYE EXAM RETINAL OR DILATED Q1 1/19/1982 Pneumococcal 19-64 Medium Risk (1 of 1 - PPSV23) 1/19/1991 DTaP/Tdap/Td series (1 - Tdap) 1/19/1993 PAP AKA CERVICAL CYTOLOGY 1/19/1993 HEMOGLOBIN A1C Q6M 6/2/2017 INFLUENZA AGE 9 TO ADULT 8/1/2017 Allergies as of 6/27/2017  Review Complete On: 6/27/2017 By: Eliana Sotomayor MD  
  
 Severity Noted Reaction Type Reactions [place] Juice High 12/03/2016    Anaphylaxis Pcn [Penicillins]  09/25/2016    Rash Current Immunizations  Reviewed on 12/3/2016 Name Date Influenza Vaccine 10/1/2016 Not reviewed this visit You Were Diagnosed With   
  
 Codes Comments Essential hypertension    -  Primary ICD-10-CM: I10 
ICD-9-CM: 401.9 Type 2 diabetes mellitus with diabetic polyneuropathy, with long-term current use of insulin (HCC)     ICD-10-CM: E11.42, Z79.4 ICD-9-CM: 250.60, 357.2, V58.67 Pain in both feet     ICD-10-CM: M79.671, T60.524 ICD-9-CM: 729.5 Xerostomia     ICD-10-CM: R68.2 ICD-9-CM: 527.7 Chronic pain syndrome     ICD-10-CM: G89.4 ICD-9-CM: 338.4 Long term (current) use of opiate analgesic     ICD-10-CM: U71.585 ICD-9-CM: V58.69 Vitals BP Pulse Temp Resp Height(growth percentile) Weight(growth percentile) (!) 157/100 (BP 1 Location: Right arm, BP Patient Position: Sitting) 84 97.7 °F (36.5 °C) (Oral) 19 5' 6\" (1.676 m) 239 lb (108.4 kg) SpO2 BMI OB Status Smoking Status 96% 38.58 kg/m2 Having regular periods Current Every Day Smoker Vitals History BMI and BSA Data Body Mass Index Body Surface Area 38.58 kg/m 2 2.25 m 2 Preferred Pharmacy Pharmacy Name Phone [phone] [facility] [phone] Your Updated Medication List  
  
   
This list is accurate as of: 6/27/17  3:44 PM.  Always use your most recent med list.  
  
  
  
  
 albuterol 90 mcg/actuation inhaler Commonly known as:  PROVENTIL HFA, VENTOLIN HFA, PROAIR HFA Take 2 Puffs by inhalation every four (4) hours as needed for Wheezing. aspirin delayed-release 81 mg tablet Take 1 Tab by mouth daily. atorvastatin 20 mg tablet Commonly known as:  LIPITOR Take 1 Tab by mouth daily. Blood Pressure Monitor Kit Commonly known as:  BLOOD PRESSURE KIT Use twice daily Blood-Glucose Meter monitoring kit Use as directed  
  
 cloNIDine HCl 0.2 mg tablet Commonly known as:  CATAPRES Take 1 Tab by mouth two (2) times a day. fluticasone-salmeterol 500-50 mcg/dose diskus inhaler Commonly known as:  ADVAIR DISKUS Take 1 Puff by inhalation every twelve (12) hours. furosemide 20 mg tablet Commonly known as:  LASIX Daily  
  
 gabapentin 300 mg capsule Commonly known as:  NEURONTIN Take 2 Caps by mouth three (3) times daily. glucose blood VI test strips strip Commonly known as:  blood glucose test  
Use as directed  
  
 hydrOXYzine HCl 25 mg tablet Commonly known as:  ATARAX Take 25 mg by mouth three (3) times daily as needed for Itching. insulin aspart 100 unit/mL injection Commonly known as:  NovoLOG  
25 Units by SubCUTAneous route Before breakfast, lunch, and dinner. insulin glargine 100 unit/mL injection Commonly known as:  LANTUS  
40 Units by SubCUTAneous route nightly. Lancets Misc Use as directed  
  
 losartan-hydroCHLOROthiazide 100-12.5 mg per tablet Commonly known as:  HYZAAR  
 Take 1 Tab by mouth daily. metFORMIN 1,000 mg tablet Commonly known as:  GLUCOPHAGE Take 1 Tab by mouth two (2) times daily (with meals). metoprolol succinate 100 mg tablet Commonly known as:  TOPROL-XL Take 1 Tab by mouth daily. NIFEDICAL XL 60 mg ER tablet Generic drug:  NIFEdipine ER  
take 1 tablet by mouth once daily  
  
 nitroglycerin 0.4 mg SL tablet Commonly known as:  NITROSTAT  
1 Tab by SubLINGual route as needed for Chest Pain. omeprazole 40 mg capsule Commonly known as:  PRILOSEC Take 1 Cap by mouth daily. oxyCODONE-acetaminophen 5-325 mg per tablet Commonly known as:  PERCOCET Take 1 Tab by mouth every eight (8) hours as needed for Pain. Max Daily Amount: 3 Tabs. sertraline 50 mg tablet Commonly known as:  ZOLOFT Take 1 Tab by mouth daily. tiotropium bromide 2.5 mcg/actuation inhaler Commonly known as:  Temo Jacques Take 2 Puffs by inhalation daily. traZODone 100 mg tablet Commonly known as:  Franci Morale Take 1 Tab by mouth nightly. Prescriptions Printed Refills Blood Pressure Monitor (BLOOD PRESSURE KIT) kit 0 Sig: Use twice daily Class: Print  
 oxyCODONE-acetaminophen (PERCOCET) 5-325 mg per tablet 0 Sig: Take 1 Tab by mouth every eight (8) hours as needed for Pain. Max Daily Amount: 3 Tabs. Class: Print Route: Oral  
  
We Performed the Following REFERRAL TO PODIATRY [REF90 Custom] Comments:  
 Please evaluate patient for diabetic foot exam  
  
Follow-up Instructions Return in about 2 weeks (around 7/11/2017) for dm, htn. To-Do List   
 06/27/2017 Lab:  COMPLIANCE DRUG SCREEN/PRESCRIPTION MONITORING   
  
 06/27/2017 Imaging:  XR FOOT LT MIN 3 V   
  
 06/27/2017 Imaging:  XR FOOT RT MIN 3 V Referral Information Referral ID Referred By Referred To  
  
 [id number] Bonnie Yeager Not Available Visits Status Start Date End Date 1 New Request 6/27/17 6/27/18 If your referral has a status of pending review or denied, additional information will be sent to support the outcome of this decision. Introducing [facility]! Toribio Nyhan introduces WHILL patient portal. Now you can access parts of your medical record, email your doctor's office, and request medication refills online. 1. In your internet browser, go to [email or web address] 2. Click on the First Time User? Click Here link in the Sign In box. You will see the New Member Sign Up page. 3. Enter your WHILL Access Code exactly as it appears below. You will not need to use this code after youve completed the sign-up process. If you do not sign up before the expiration date, you must request a new code. · WHILL Access Code: [email or web address] Expires: 8/1/2017  1:36 PM 
 
4. Enter the last four digits of your Social Security Number (xxxx) and Date of Birth (mm/dd/yyyy) as indicated and click Submit. You will be taken to the next sign-up page. 5. Create a WHILL ID. This will be your WHILL login ID and cannot be changed, so think of one that is secure and easy to remember. 6. Create a WHILL password. You can change your password at any time. 7. Enter your Password Reset Question and Answer. This can be used at a later time if you forget your password. 8. Enter your e-mail address. You will receive e-mail notification when new information is available in [address] 9. Click Sign Up. You can now view and download portions of your medical record. 10. Click the Download Summary menu link to download a portable copy of your medical information. If you have questions, please visit the Frequently Asked Questions section of the WHILL website. Remember, WHILL is NOT to be used for urgent needs. For medical emergencies, dial 911. Now available from your iPhone and Android! Please provide this summary of care documentation to your next provider. Your primary care clinician is listed as Osman Hodge. If you have any questions after today's visit, please call [phone].

## 2017-06-27 NOTE — PROGRESS NOTES
Patient presents to clinic for routine follow up    Advance Directive:    1. Do you have an advance directive in place? Patient Reply:     2. If not, would you like material regarding how to put one in place? Patient Reply:      Coordination of Care:    1. Have you been to the ER, urgent care clinic since your last visit? Hospitalized since your last visit? No    2. Have you seen or consulted any other health care providers outside of the [address] [place] [address] since your last visit? Include any pap smears or colon screening. No    Depression Screening completed. Learning Assessment completed. Abuse Screening completed. Health Maintenance reviewed and discussed per provider.
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
## 2017-07-05 ENCOUNTER — TELEPHONE (OUTPATIENT)
Dept: FAMILY MEDICINE CLINIC | Age: 47
End: 2017-07-05

## 2017-07-05 LAB
DRUGS UR: NORMAL
PDF, 451356: NORMAL

## 2017-08-07 ENCOUNTER — HOSPITAL ENCOUNTER (EMERGENCY)
Age: 47
Discharge: HOME | End: 2017-08-07
Attending: EMERGENCY MEDICINE
Payer: MEDICARE

## 2017-08-07 ENCOUNTER — OFFICE VISIT (OUTPATIENT)
Dept: FAMILY MEDICINE CLINIC | Age: 47
End: 2017-08-07

## 2017-08-07 VITALS
HEART RATE: 82 BPM | BODY MASS INDEX: 40.66 KG/M2 | HEIGHT: 66 IN | WEIGHT: 253 LBS | DIASTOLIC BLOOD PRESSURE: 100 MMHG | OXYGEN SATURATION: 100 % | RESPIRATION RATE: 20 BRPM | SYSTOLIC BLOOD PRESSURE: 150 MMHG | TEMPERATURE: 97.2 F

## 2017-08-07 VITALS
OXYGEN SATURATION: 100 % | HEART RATE: 86 BPM | RESPIRATION RATE: 18 BRPM | TEMPERATURE: 98.7 F | DIASTOLIC BLOOD PRESSURE: 100 MMHG | SYSTOLIC BLOOD PRESSURE: 171 MMHG

## 2017-08-07 DIAGNOSIS — I10 ESSENTIAL HYPERTENSION: ICD-10-CM

## 2017-08-07 DIAGNOSIS — M79.671 PAIN IN BOTH FEET: ICD-10-CM

## 2017-08-07 DIAGNOSIS — E11.42 TYPE 2 DIABETES MELLITUS WITH DIABETIC POLYNEUROPATHY, WITH LONG-TERM CURRENT USE OF INSULIN (HCC): ICD-10-CM

## 2017-08-07 DIAGNOSIS — G89.4 CHRONIC PAIN SYNDROME: ICD-10-CM

## 2017-08-07 DIAGNOSIS — M79.672 PAIN IN BOTH FEET: ICD-10-CM

## 2017-08-07 DIAGNOSIS — Z79.4 TYPE 2 DIABETES MELLITUS WITH DIABETIC POLYNEUROPATHY, WITH LONG-TERM CURRENT USE OF INSULIN (HCC): ICD-10-CM

## 2017-08-07 DIAGNOSIS — E78.5 HYPERLIPIDEMIA, UNSPECIFIED HYPERLIPIDEMIA TYPE: ICD-10-CM

## 2017-08-07 DIAGNOSIS — A59.01 TRICHOMONAL VAGINITIS: Primary | ICD-10-CM

## 2017-08-07 DIAGNOSIS — Z79.891 LONG TERM (CURRENT) USE OF OPIATE ANALGESIC: Primary | ICD-10-CM

## 2017-08-07 LAB
AMPHET UR QL SCN: NEGATIVE
APPEARANCE UR: ABNORMAL
BACTERIA URNS QL MICRO: NEGATIVE /HPF
BARBITURATES UR QL SCN: NEGATIVE
BENZODIAZ UR QL: NEGATIVE
BILIRUB UR QL: NEGATIVE
CANNABINOIDS UR QL SCN: NEGATIVE
COCAINE UR QL SCN: NEGATIVE
COLOR UR: YELLOW
EPITH CASTS URNS QL MICRO: NORMAL /LPF (ref 0–5)
GLUCOSE UR STRIP.AUTO-MCNC: NEGATIVE MG/DL
HDSCOM,HDSCOM: NORMAL
HGB UR QL STRIP: NEGATIVE
KETONES UR QL STRIP.AUTO: NEGATIVE MG/DL
LEUKOCYTE ESTERASE UR QL STRIP.AUTO: ABNORMAL
METHADONE UR QL: NEGATIVE
NITRITE UR QL STRIP.AUTO: NEGATIVE
OPIATES UR QL: NEGATIVE
PCP UR QL: NEGATIVE
PH UR STRIP: 5 [PH] (ref 5–8)
PROT UR STRIP-MCNC: NEGATIVE MG/DL
RBC #/AREA URNS HPF: 0 /HPF (ref 0–5)
SERVICE CMNT-IMP: NORMAL
SP GR UR REFRACTOMETRY: >1.03 (ref 1–1.03)
UROBILINOGEN UR QL STRIP.AUTO: 0.2 EU/DL (ref 0.2–1)
WBC URNS QL MICRO: NORMAL /HPF (ref 0–4)
WET PREP GENITAL: NORMAL

## 2017-08-07 PROCEDURE — 87491 CHLMYD TRACH DNA AMP PROBE: CPT | Performed by: PHYSICIAN ASSISTANT

## 2017-08-07 PROCEDURE — 87210 SMEAR WET MOUNT SALINE/INK: CPT | Performed by: PHYSICIAN ASSISTANT

## 2017-08-07 PROCEDURE — 99284 EMERGENCY DEPT VISIT MOD MDM: CPT

## 2017-08-07 PROCEDURE — 81001 URINALYSIS AUTO W/SCOPE: CPT | Performed by: EMERGENCY MEDICINE

## 2017-08-07 PROCEDURE — 80307 DRUG TEST PRSMV CHEM ANLYZR: CPT | Performed by: EMERGENCY MEDICINE

## 2017-08-07 PROCEDURE — 74011250637 HC RX REV CODE- 250/637: Performed by: PHYSICIAN ASSISTANT

## 2017-08-07 RX ORDER — HYDROCODONE BITARTRATE AND ACETAMINOPHEN 5; 325 MG/1; MG/1
1 TABLET ORAL
Status: COMPLETED | OUTPATIENT
Start: 2017-08-07 | End: 2017-08-07

## 2017-08-07 RX ORDER — ACETAMINOPHEN 500 MG
TABLET ORAL
Qty: 1 KIT | Refills: 0 | Status: SHIPPED | OUTPATIENT
Start: 2017-08-07

## 2017-08-07 RX ORDER — METRONIDAZOLE 250 MG/1
2000 TABLET ORAL ONCE
Status: COMPLETED | OUTPATIENT
Start: 2017-08-07 | End: 2017-08-07

## 2017-08-07 RX ADMIN — METRONIDAZOLE 2000 MG: 250 TABLET ORAL at 18:05

## 2017-08-07 RX ADMIN — HYDROCODONE BITARTRATE AND ACETAMINOPHEN 1 TABLET: 5; 325 TABLET ORAL at 18:06

## 2017-08-07 NOTE — MR AVS SNAPSHOT
Visit Information Date & Time Provider Department Dept. Phone Encounter #  
 8/7/2017  3:45 PM Afsaneh Sommers, [address] [id number] Follow-up Instructions Return in about 2 weeks (around 8/21/2017) for pain, dm, labs. Your Appointments 8/7/2017  3:45 PM  
ROUTINE CARE with Afsaneh Sommers MD  
[address] [place]) Appt Note: med refill and pt states orthopedic physician advised her to see pcp [address] [facility] [phone] [place]  
  
   
 [address] [place] [address] [address] Upcoming Health Maintenance Date Due  
 LIPID PANEL Q1 1972 FOOT EXAM Q1 1/19/1982 MICROALBUMIN Q1 1/19/1982 EYE EXAM RETINAL OR DILATED Q1 1/19/1982 Pneumococcal 19-64 Medium Risk (1 of 1 - PPSV23) 1/19/1991 DTaP/Tdap/Td series (1 - Tdap) 1/19/1993 PAP AKA CERVICAL CYTOLOGY 1/19/1993 HEMOGLOBIN A1C Q6M 6/2/2017 INFLUENZA AGE 9 TO ADULT 8/1/2017 Allergies as of 8/7/2017  Review Complete On: 8/7/2017 By: Afsaneh Sommers MD  
  
 Severity Noted Reaction Type Reactions [place] Juice High 12/03/2016    Anaphylaxis Pcn [Penicillins]  09/25/2016    Rash Current Immunizations  Reviewed on 12/3/2016 Name Date Influenza Vaccine 10/1/2016 Not reviewed this visit You Were Diagnosed With   
  
 Codes Comments Long term (current) use of opiate analgesic    -  Primary ICD-10-CM: O10.308 ICD-9-CM: V58.69 Type 2 diabetes mellitus with diabetic polyneuropathy, with long-term current use of insulin (HCC)     ICD-10-CM: E11.42, Z79.4 ICD-9-CM: 250.60, 357.2, V58.67 Essential hypertension     ICD-10-CM: I10 
ICD-9-CM: 401.9 Pain in both feet     ICD-10-CM: M79.671, C23.664 ICD-9-CM: 729.5 Chronic pain syndrome     ICD-10-CM: G89.4 ICD-9-CM: 338.4 Hyperlipidemia, unspecified hyperlipidemia type     ICD-10-CM: E78.5 ICD-9-CM: 272.4 Vitals BP Pulse Temp Resp Height(growth percentile) Weight(growth percentile) (!) 150/100 82 97.2 °F (36.2 °C) (Oral) 20 5' 6\" (1.676 m) 253 lb (114.8 kg) LMP SpO2 BMI OB Status Smoking Status 07/20/2017 100% 40.84 kg/m2 Having regular periods Current Every Day Smoker Vitals History BMI and BSA Data Body Mass Index Body Surface Area  
 40.84 kg/m 2 2.31 m 2 Preferred Pharmacy Pharmacy Name Phone [phone] [facility] [phone] Your Updated Medication List  
  
   
This list is accurate as of: 8/7/17  3:31 PM.  Always use your most recent med list.  
  
  
  
  
 albuterol 90 mcg/actuation inhaler Commonly known as:  PROVENTIL HFA, VENTOLIN HFA, PROAIR HFA Take 2 Puffs by inhalation every four (4) hours as needed for Wheezing. aspirin delayed-release 81 mg tablet Take 1 Tab by mouth daily. atorvastatin 20 mg tablet Commonly known as:  LIPITOR Take 1 Tab by mouth daily. Blood Pressure Monitor Kit Commonly known as:  BLOOD PRESSURE KIT Use twice daily Blood-Glucose Meter monitoring kit Use as directed  
  
 cloNIDine HCl 0.2 mg tablet Commonly known as:  CATAPRES Take 1 Tab by mouth two (2) times a day. furosemide 20 mg tablet Commonly known as:  LASIX Daily  
  
 gabapentin 300 mg capsule Commonly known as:  NEURONTIN Take 2 Caps by mouth three (3) times daily. glucose blood VI test strips strip Commonly known as:  blood glucose test  
Use as directed  
  
 hydrOXYzine HCl 25 mg tablet Commonly known as:  ATARAX Take 25 mg by mouth three (3) times daily as needed for Itching. insulin aspart 100 unit/mL injection Commonly known as:  NovoLOG  
25 Units by SubCUTAneous route Before breakfast, lunch, and dinner. insulin glargine 100 unit/mL injection Commonly known as:  LANTUS  
40 Units by SubCUTAneous route nightly. Lancets Misc Use as directed  
  
 losartan-hydroCHLOROthiazide 100-12.5 mg per tablet Commonly known as:  HYZAAR Take 1 Tab by mouth daily. metFORMIN 1,000 mg tablet Commonly known as:  GLUCOPHAGE Take 1 Tab by mouth two (2) times daily (with meals). metoprolol succinate 100 mg tablet Commonly known as:  TOPROL-XL Take 1 Tab by mouth daily. NIFEDICAL XL 60 mg ER tablet Generic drug:  NIFEdipine ER  
take 1 tablet by mouth once daily  
  
 nitroglycerin 0.4 mg SL tablet Commonly known as:  NITROSTAT  
1 Tab by SubLINGual route as needed for Chest Pain. omeprazole 40 mg capsule Commonly known as:  PRILOSEC Take 1 Cap by mouth daily. oxyCODONE-acetaminophen 5-325 mg per tablet Commonly known as:  PERCOCET Take 1 Tab by mouth every eight (8) hours as needed for Pain. Max Daily Amount: 3 Tabs. sertraline 50 mg tablet Commonly known as:  ZOLOFT Take 1 Tab by mouth daily. tiotropium bromide 2.5 mcg/actuation inhaler Commonly known as:  Qing Calvert Take 2 Puffs by inhalation daily. traZODone 100 mg tablet Commonly known as:  Donneta Francisco Take 1 Tab by mouth nightly. Prescriptions Printed Refills Blood Pressure Monitor (BLOOD PRESSURE KIT) kit 0 Sig: Use twice daily Class: Print We Performed the Following REFERRAL TO OPHTHALMOLOGY [REF57 Custom] Comments:  
 Please evaluate patient for diabetes eye exam. Patient also has chronic hypertension. Follow-up Instructions Return in about 2 weeks (around 8/21/2017) for pain, dm, labs. To-Do List   
 08/07/2017 Lab:  CBC WITH AUTOMATED DIFF   
  
 08/07/2017 Lab:  COMPLIANCE DRUG SCREEN/PRESCRIPTION MONITORING   
  
 08/07/2017 Lab:  HEMOGLOBIN A1C WITH EAG   
  
 08/07/2017 Lab:  LIPID PANEL   
  
 08/07/2017 Lab:  METABOLIC PANEL, COMPREHENSIVE   
  
 08/07/2017 Lab: MICROALBUMIN, UR, RAND W/ MICROALBUMIN/CREA RATIO   
  
 08/07/2017 Lab:  URIC ACID Referral Information Referral ID Referred By Referred To  
  
 [id number] Latvian Nikki Not Available Visits Status Start Date End Date 1 New Request 8/7/17 8/7/18 If your referral has a status of pending review or denied, additional information will be sent to support the outcome of this decision. Introducing [facility]! Gianfranco Engel introduces Quixby patient portal. Now you can access parts of your medical record, email your doctor's office, and request medication refills online. 1. In your internet browser, go to [email or web address] 2. Click on the First Time User? Click Here link in the Sign In box. You will see the New Member Sign Up page. 3. Enter your Quixby Access Code exactly as it appears below. You will not need to use this code after youve completed the sign-up process. If you do not sign up before the expiration date, you must request a new code. · Quixby Access Code: [email or web address] Expires: 11/5/2017  3:31 PM 
 
4. Enter the last four digits of your Social Security Number (xxxx) and Date of Birth (mm/dd/yyyy) as indicated and click Submit. You will be taken to the next sign-up page. 5. Create a Quixby ID. This will be your Quixby login ID and cannot be changed, so think of one that is secure and easy to remember. 6. Create a Quixby password. You can change your password at any time. 7. Enter your Password Reset Question and Answer. This can be used at a later time if you forget your password. 8. Enter your e-mail address. You will receive e-mail notification when new information is available in [address] 9. Click Sign Up. You can now view and download portions of your medical record. 10. Click the Download Summary menu link to download a portable copy of your medical information. If you have questions, please visit the Frequently Asked Questions section of the [employer] website. Remember, [employer] is NOT to be used for urgent needs. For medical emergencies, dial 911. Now available from your iPhone and Android! Please provide this summary of care documentation to your next provider. Your primary care clinician is listed as Darnell Valdez. If you have any questions after today's visit, please call [phone].

## 2017-08-07 NOTE — Clinical Note
Increase fluids Avoid sexual activity 1 week and safe sexual practices following Notify all sexual partners for treatment to avoid reinfection

## 2017-08-07 NOTE — DISCHARGE INSTRUCTIONS
High Blood Pressure: Care Instructions  Your Care Instructions  If your blood pressure is usually above 140/90, you have high blood pressure, or hypertension. That means the top number is 140 or higher or the bottom number is 90 or higher, or both. Despite what a lot of people think, high blood pressure usually doesn't cause headaches or make you feel dizzy or lightheaded. It usually has no symptoms. But it does increase your risk for heart attack, stroke, and kidney or eye damage. The higher your blood pressure, the more your risk increases. Your doctor will give you a goal for your blood pressure. Your goal will be based on your health and your age. An example of a goal is to keep your blood pressure below 140/90. Lifestyle changes, such as eating healthy and being active, are always important to help lower blood pressure. You might also take medicine to reach your blood pressure goal.  Follow-up care is a key part of your treatment and safety. Be sure to make and go to all appointments, and call your doctor if you are having problems. It's also a good idea to know your test results and keep a list of the medicines you take. How can you care for yourself at home? Medical treatment  · If you stop taking your medicine, your blood pressure will go back up. You may take one or more types of medicine to lower your blood pressure. Be safe with medicines. Take your medicine exactly as prescribed. Call your doctor if you think you are having a problem with your medicine. · Talk to your doctor before you start taking aspirin every day. Aspirin can help certain people lower their risk of a heart attack or stroke. But taking aspirin isn't right for everyone, because it can cause serious bleeding. · See your doctor regularly. You may need to see the doctor more often at first or until your blood pressure comes down.   · If you are taking blood pressure medicine, talk to your doctor before you take decongestants or anti-inflammatory medicine, such as ibuprofen. Some of these medicines can raise blood pressure. · Learn how to check your blood pressure at home. Lifestyle changes  · Stay at a healthy weight. This is especially important if you put on weight around the waist. Losing even 10 pounds can help you lower your blood pressure. · If your doctor recommends it, get more exercise. Walking is a good choice. Bit by bit, increase the amount you walk every day. Try for at least 30 minutes on most days of the week. You also may want to swim, bike, or do other activities. · Avoid or limit alcohol. Talk to your doctor about whether you can drink any alcohol. · Try to limit how much sodium you eat to less than 2,300 milligrams (mg) a day. Your doctor may ask you to try to eat less than 1,500 mg a day. · Eat plenty of fruits (such as bananas and oranges), vegetables, legumes, whole grains, and low-fat dairy products. · Lower the amount of saturated fat in your diet. Saturated fat is found in animal products such as milk, cheese, and meat. Limiting these foods may help you lose weight and also lower your risk for heart disease. · Do not smoke. Smoking increases your risk for heart attack and stroke. If you need help quitting, talk to your doctor about stop-smoking programs and medicines. These can increase your chances of quitting for good. When should you call for help? Call 911 anytime you think you may need emergency care. This may mean having symptoms that suggest that your blood pressure is causing a serious heart or blood vessel problem. Your blood pressure may be over 180/110. For example, call 911 if:  · You have symptoms of a heart attack. These may include:  ¨ Chest pain or pressure, or a strange feeling in the chest.  ¨ Sweating. ¨ Shortness of breath. ¨ Nausea or vomiting. ¨ Pain, pressure, or a strange feeling in the back, neck, jaw, or upper belly or in one or both shoulders or arms.   ¨ Lightheadedness or sudden weakness. ¨ A fast or irregular heartbeat. · You have symptoms of a stroke. These may include:  ¨ Sudden numbness, tingling, weakness, or loss of movement in your face, arm, or leg, especially on only one side of your body. ¨ Sudden vision changes. ¨ Sudden trouble speaking. ¨ Sudden confusion or trouble understanding simple statements. ¨ Sudden problems with walking or balance. ¨ A sudden, severe headache that is different from past headaches. · You have severe back or belly pain. Do not wait until your blood pressure comes down on its own. Get help right away. Call your doctor now or seek immediate care if:  · Your blood pressure is much higher than normal (such as 180/110 or higher), but you don't have symptoms. · You think high blood pressure is causing symptoms, such as:  ¨ Severe headache. ¨ Blurry vision. Watch closely for changes in your health, and be sure to contact your doctor if:  · Your blood pressure measures 140/90 or higher at least 2 times. That means the top number is 140 or higher or the bottom number is 90 or higher, or both. · You think you may be having side effects from your blood pressure medicine. · Your blood pressure is usually normal, but it goes above normal at least 2 times. Where can you learn more? Go to http://shahnaz-martin.info/. Enter M380 in the search box to learn more about \"High Blood Pressure: Care Instructions. \"  Current as of: August 8, 2016  Content Version: 11.3  © 9123-9806 [employer]. Care instructions adapted under license by [employer] (which disclaims liability or warranty for this information). If you have questions about a medical condition or this instruction, always ask your healthcare professional. [place] [address] any warranty or liability for your use of this information.          Trichomoniasis: Care Instructions  Your Care Instructions  Trichomoniasis is a sexually transmitted infection (STI) that is spread by having sex with an infected partner. Trichomoniasis is commonly called trich (say \"trick\"). In women, trich may cause vaginal itching and a smelly discharge. But in many cases, especially in men, there are no symptoms. Rosa Jin is treated so that you do not spread it to others. Both you and your sex partner or partners should be treated at the same time so you do not infect each other again. Trich may cause problems with pregnancy. Your doctor will talk with you about treatment for Trich if you are pregnant. Follow-up care is a key part of your treatment and safety. Be sure to make and go to all appointments, and call your doctor if you are having problems. Its also a good idea to know your test results and keep a list of the medicines you take. How can you care for yourself at home? · Take your antibiotics as directed. Do not stop taking them just because you feel better. You need to take the full course of antibiotics. · Do not have sex while you are being treated. If your doctor gave you a single dose of antibiotics, do not have sex for one week after being treated and until your partner also has been treated. · Tell your sex partner (or partners) that he or she will also need to be tested and treated. · Use a cold water compress or cool baths to relieve itching. To prevent trichomoniasis in the future  · Use latex condoms every time you have sex. Use them from the beginning to the end of sexual contact. · Talk to your partner before having sex. Find out if he or she has or is at risk for trich or any other STI. Keep in mind that a person may be able to spread an STI even if he or she does not have symptoms. · Do not have sex if you are being treated for trich or any other STI. · Do not have sex with anyone who has symptoms of an STI, such as sores on the genitals or mouth.   · Having one sex partner (who does not have STIs and does not have sex with anyone else) is a good way to avoid STIs. When should you call for help? Call your doctor now or seek immediate medical care if:  · You have unusual vaginal bleeding. · You have a fever. · You have new discharge from the vagina or penis. · You have pelvic pain. Watch closely for changes in your health, and be sure to contact your doctor if:  · You do not get better as expected. · You have any new symptoms or your symptoms get worse. Where can you learn more? Go to http://shahnaz-martin.info/. Enter T128 in the search box to learn more about \"Trichomoniasis: Care Instructions. \"  Current as of: March 20, 2017  Content Version: 11.3  © 4699-6702 [employer], [employer]. Care instructions adapted under license by [employer] (which disclaims liability or warranty for this information). If you have questions about a medical condition or this instruction, always ask your healthcare professional. [address] any warranty or liability for your use of this information.

## 2017-08-07 NOTE — PROGRESS NOTES
1. Have you been to the ER, urgent care clinic since your last visit? Hospitalized since your last visit? No    2. Have you seen or consulted any other health care providers outside of the [address] [place] [address] since your last visit? Include any pap smears or colon screening.  No

## 2017-08-07 NOTE — ED PROVIDER NOTES
HPI Comments: Angi Antunez is a 41 y.o. female that presents to the ED with multiple complaints. Pt statest hat she has chronic pain and sent to pain management but has not yet hears back. She states that she needs a drug screen test to be seen by here doctor. She states that she has pelvic pain and frequency of urination. Pt states that she takes all her medication as prescribed. PT has hx of DM, HTN, RA, and COPD    Patient is a 41 y.o. female presenting with abdominal pain. Abdominal Pain    Associated symptoms include frequency. Pertinent negatives include no fever, no arthralgias, no myalgias and no chest pain. Past Medical History:   Diagnosis Date    Asthma     CAD (coronary artery disease)     [facility]     Crack cocaine use     last used August 2016    Diabetes [facility])     Hypertension     Rheumatoid arthritis ([facility] [address] [facility]        History reviewed. No pertinent surgical history. Family History:   Problem Relation Age of Onset    Hypertension Mother     Diabetes Mother     Lupus Mother     Kidney Disease Mother     Hypertension Father        Social History     Social History    Marital status:      Spouse name: N/A    Number of children: N/A    Years of education: N/A     Occupational History    Not on file. Social History Main Topics    Smoking status: Current Every Day Smoker     Packs/day: 0.25    Smokeless tobacco: Never Used    Alcohol use Yes      Comment: socially    Drug use: Yes     Special: Cocaine    Sexual activity: Yes     Partners: Male     Birth control/ protection: None     Other Topics Concern    Not on file     Social History Narrative         ALLERGIES: Orange juice and Pcn [penicillins]    Review of Systems   Constitutional: Negative for fever. Respiratory: Negative for shortness of breath. Cardiovascular: Negative for chest pain. Gastrointestinal: Positive for abdominal pain.    Genitourinary: Positive for frequency. Negative for vaginal discharge. Musculoskeletal: Negative for arthralgias and myalgias. Skin: Negative for wound. All other systems reviewed and are negative. Vitals:    08/07/17 1607   BP: (!) 171/100   Pulse: 86   Resp: 18   Temp: 98.7 °F (37.1 °C)   SpO2: 100%            Physical Exam   Constitutional: She is oriented to person, place, and time. She appears well-developed and well-nourished. No distress. Pt appears obese   HENT:   Head: Normocephalic and atraumatic. Nose: Nose normal.   Eyes: Conjunctivae are normal. Pupils are equal, round, and reactive to light. Cardiovascular: Normal rate and regular rhythm. Pulmonary/Chest: Effort normal and breath sounds normal. No respiratory distress. She has no wheezes. Abdominal: Soft. Normal appearance and bowel sounds are normal. There is tenderness in the right lower quadrant, suprapubic area and left lower quadrant. There is no rigidity, no rebound, no guarding, no tenderness at McBurney's point and negative Crews's sign. Neurological: She is alert and oriented to person, place, and time. Skin: Skin is warm and dry. Psychiatric: She has a normal mood and affect. Her behavior is normal.   Vitals reviewed. MDM  Number of Diagnoses or Management Options  Essential hypertension:   Trichomonal vaginitis:   Diagnosis management comments: Labs Reviewed  URINALYSIS W/ RFLX MICROSCOPIC - Abnormal; Notable for the following:      Specific gravity              >1.030 (*)               Leukocyte Esterase            TRACE (*)            All other components within normal limits  WET PREP  DRUG SCREEN, URINE  URINE MICROSCOPIC ONLY  CHLAMYDIA/NEISSERIA AMPLIFICATION    Pt refused prophylactic treatment for G/C a this time.   We will call in 2 days with results    IMpression: Trich, chronic pain    Plan: discharge home  Increase fluids  Take antibiotics as directed and until gone  Avoid sexual activity 1 week and safe sexual practices following  Notify all sexual partners for treatment to avoid reinfection       Amount and/or Complexity of Data Reviewed  Clinical lab tests: ordered and reviewed  Tests in the medicine section of CPT®: ordered and reviewed    Risk of Complications, Morbidity, and/or Mortality  Presenting problems: low  Diagnostic procedures: low  Management options: low    Patient Progress  Patient progress: stable    ED Course       Procedures

## 2017-08-07 NOTE — PROGRESS NOTES
History of Present Illness  Hortencia Aparicio is a 41 y.o. female who presents today for management of    Chief Complaint   Patient presents with    Medication Refill    Hypertension    Diabetes    Cholesterol Problem       Patient was seen by orthopedic surgery for her leg and back pain. Xrays showed osteoarthritis. The office notes are not available at this time. She was referred to pain management. She has not taken any pain medication for one week. Pain intensity 9/10. She takes Lantus 40 units daily and Novolog 25 units TID. She does not check her home blood sugars. He has an appointment with podiatry next week for foot pain. He is compliant with her medications. Problem List  Patient Active Problem List    Diagnosis Date Noted    H/O cocaine abuse 01/31/2017    Chronic narcotic use 01/31/2017    Misuse of prescription only drugs 01/31/2017    Essential hypertension 12/06/2016    Type 2 diabetes mellitus with diabetic polyneuropathy, with long-term current use of insulin ([address] 12/06/2016    Chronic obstructive pulmonary disease ([address] 12/06/2016    Hyperlipidemia 12/06/2016    Chronic systolic congestive heart failure ([address] 12/06/2016    Coronary artery disease involving native coronary artery of native heart without angina pectoris 12/06/2016    Sciatica of right side 12/06/2016    History of rheumatoid arthritis 12/06/2016    Tobacco use 12/02/2016    GERD (gastroesophageal reflux disease) 12/02/2016    PRETTY (acute kidney injury) ([address] 12/02/2016       Past Medical History  Past Medical History:   Diagnosis Date    Asthma     CAD (coronary artery disease)     COPD (chronic obstructive pulmonary disease) ([address]     Crack cocaine use     last used August 2016    Diabetes ([address]     Hypertension     Rheumatoid arthritis ([address]         Surgical History  No past surgical history on file.      Current Medications  Current Outpatient Prescriptions   Medication Sig    Blood Pressure Monitor (BLOOD PRESSURE KIT) kit Use twice daily    oxyCODONE-acetaminophen (PERCOCET) 5-325 mg per tablet Take 1 Tab by mouth every eight (8) hours as needed for Pain. Max Daily Amount: 3 Tabs.  Lancets misc Use as directed    cloNIDine HCl (CATAPRES) 0.2 mg tablet Take 1 Tab by mouth two (2) times a day.  gabapentin (NEURONTIN) 300 mg capsule Take 2 Caps by mouth three (3) times daily.  sertraline (ZOLOFT) 50 mg tablet Take 1 Tab by mouth daily.  albuterol (PROVENTIL HFA, VENTOLIN HFA, PROAIR HFA) 90 mcg/actuation inhaler Take 2 Puffs by inhalation every four (4) hours as needed for Wheezing.  tiotropium bromide (SPIRIVA RESPIMAT) 2.5 mcg/actuation inhaler Take 2 Puffs by inhalation daily.  furosemide (LASIX) 20 mg tablet Daily    omeprazole (PRILOSEC) 40 mg capsule Take 1 Cap by mouth daily.  metFORMIN (GLUCOPHAGE) 1,000 mg tablet Take 1 Tab by mouth two (2) times daily (with meals).  aspirin delayed-release 81 mg tablet Take 1 Tab by mouth daily.  insulin aspart (NOVOLOG) 100 unit/mL injection 25 Units by SubCUTAneous route Before breakfast, lunch, and dinner.  insulin glargine (LANTUS) 100 unit/mL injection 40 Units by SubCUTAneous route nightly.  atorvastatin (LIPITOR) 20 mg tablet Take 1 Tab by mouth daily.  metoprolol succinate (TOPROL-XL) 100 mg tablet Take 1 Tab by mouth daily.  losartan-hydroCHLOROthiazide (HYZAAR) 100-12.5 mg per tablet Take 1 Tab by mouth daily.  traZODone (DESYREL) 100 mg tablet Take 1 Tab by mouth nightly.  nitroglycerin (NITROSTAT) 0.4 mg SL tablet 1 Tab by SubLINGual route as needed for Chest Pain.  NIFEDICAL XL 60 mg ER tablet take 1 tablet by mouth once daily    hydrOXYzine (ATARAX) 25 mg tablet Take 25 mg by mouth three (3) times daily as needed for Itching.     Blood-Glucose Meter monitoring kit Use as directed    glucose blood VI test strips (BLOOD GLUCOSE TEST) strip Use as directed     No current facility-administered medications for this visit. Allergies/Drug Reactions  Allergies   Allergen Reactions    Orange Juice Anaphylaxis    Pcn [Penicillins] Rash        Family History  Family History   Problem Relation Age of Onset    Hypertension Mother     Diabetes Mother     Lupus Mother     Kidney Disease Mother     Hypertension Father         Social History  Social History     Social History    Marital status:      Spouse name: N/A    Number of children: N/A    Years of education: N/A     Occupational History    Not on file. Social History Main Topics    Smoking status: Current Every Day Smoker     Packs/day: 0.25    Smokeless tobacco: Never Used    Alcohol use Yes      Comment: socially    Drug use: Yes     Special: Cocaine    Sexual activity: Yes     Partners: Male     Birth control/ protection: None     Other Topics Concern    Not on file     Social History Narrative       Review of Systems  General ROS: negative for - chills, fatigue or fever  Respiratory ROS: no cough, shortness of breath, or wheezing  Cardiovascular ROS: no chest pain or dyspnea on exertion  Gastrointestinal ROS: no abdominal pain, change in bowel habits, or black or bloody stools  Genito-Urinary ROS: no dysuria, trouble voiding, or hematuria  Musculoskeletal ROS: positive for - back pain, knee pain, leg pain, foot pain  Neurological ROS: positive for - numbness/tingling on both feet      Physical Exam  Vital signs:   Vitals:    08/07/17 1450 08/07/17 1454   BP: (!) 152/105 (!) 150/100   Pulse: 82    Resp: 20    Temp: 97.2 °F (36.2 °C)    TempSrc: Oral    SpO2: 100%    Weight: 253 lb (114.8 kg)    Height: 5' 6\" (1.676 m)        General: alert, oriented, not in distress  Chest/Lungs: clear breath sounds, no wheezing or crackles  Heart: normal rate, regular rhythm, no murmur  Abdomen: soft, non-distended, non-tender, normal bowel sounds, no organomegaly, no masses  Extremities: no focal deformities, no edema      Assessment/Plan:      1. Long term (current) use of opiate analgesic  - will not prescribe controlled substance until office notes from sports medicine (including imaging) and urine drug screen are done. Patient has history of cocaine use. - recommended other regimen other than narcotics, I.e. Duloxetine, but patient refused, states that she needs pain medications. - Retrieve records from sports medicine office she saw last week  - Johnna Hoang; Future    2. Type 2 diabetes mellitus with diabetic polyneuropathy, with long-term current use of insulin (HCC)  - continue Lantus 40 units daily  and Novolog 25 units TID  - CBC WITH AUTOMATED DIFF; Future  - HEMOGLOBIN A1C WITH EAG; Future  - LIPID PANEL; Future  - METABOLIC PANEL, COMPREHENSIVE; Future  - MICROALBUMIN, UR, RAND W/ MICROALBUMIN/CREA RATIO; Future  - REFERRAL TO OPHTHALMOLOGY    3. Essential hypertension  - poorly controlled, likely from pain?  - continue current medications for now  - Blood Pressure Monitor (BLOOD PRESSURE KIT) kit; Use twice daily  Dispense: 1 Kit; Refill: 0  - REFERRAL TO OPHTHALMOLOGY    4. Pain in both feet  - likely from diabetic polyneuropathy  - will see podiatry next week  - URIC ACID; Future    5. Chronic pain syndrome    6. Hyperlipidemia, unspecified hyperlipidemia type  - control uncertain  - continue Lipitor      Follow-up Disposition:  Return in about 2 weeks (around 8/21/2017) for pain, dm, labs. I have discussed the diagnosis with the patient and the intended plan as seen in the above orders. The patient has received an after-visit summary and questions were answered concerning future plans. I have discussed medication side effects and warnings with the patient as well. I have reviewed the plan of care with the patient, accepted their input and they are in agreement with the treatment goals.        Jose Enrique Abel MD  August 7, 2017

## 2017-08-07 NOTE — ED TRIAGE NOTES
\"I have pain in my lower stomach and pelvic area and I haven't had a period un a long time and my doctor won't treat me until I have a drug screen. \"

## 2017-08-08 ENCOUNTER — TELEPHONE (OUTPATIENT)
Dept: FAMILY MEDICINE CLINIC | Age: 47
End: 2017-08-08

## 2017-08-08 LAB
C TRACH RRNA SPEC QL NAA+PROBE: NEGATIVE
N GONORRHOEA RRNA SPEC QL NAA+PROBE: NEGATIVE
SPECIMEN SOURCE: NORMAL

## 2017-08-08 NOTE — TELEPHONE ENCOUNTER
Spoke with patient, informed patient that provider does not feel comfortable giving her pain medication. Pt stated that she does not have time to argue with the provider and she will call her other doctor. Per provider notes from historical ortho review and dx of mild arthritis. Pt state she has rheumatoid arthritis and nurse offered to request a rheumatology referral, pt denied and stated that she already see's a rheumatologist.   At the end of the call patient states she is no longer dealing with provider and call ended. This encounter will be closed.

## 2017-08-08 NOTE — TELEPHONE ENCOUNTER
Spoke with patient, notified that physician would like office notes before rx'ing medication. Pt advised to call ortho for notes. This encounter will be closed.

## 2017-08-30 DIAGNOSIS — E11.42 TYPE 2 DIABETES MELLITUS WITH DIABETIC POLYNEUROPATHY, WITH LONG-TERM CURRENT USE OF INSULIN (HCC): ICD-10-CM

## 2017-08-30 DIAGNOSIS — K21.00 GASTROESOPHAGEAL REFLUX DISEASE WITH ESOPHAGITIS: ICD-10-CM

## 2017-08-30 DIAGNOSIS — Z79.4 TYPE 2 DIABETES MELLITUS WITH DIABETIC POLYNEUROPATHY, WITH LONG-TERM CURRENT USE OF INSULIN (HCC): ICD-10-CM

## 2017-08-30 DIAGNOSIS — E78.5 HYPERLIPIDEMIA LDL GOAL <100: ICD-10-CM

## 2017-08-30 RX ORDER — METFORMIN HYDROCHLORIDE 1000 MG/1
1000 TABLET ORAL 2 TIMES DAILY WITH MEALS
Qty: 60 TAB | Refills: 2 | Status: SHIPPED | OUTPATIENT
Start: 2017-08-30

## 2017-08-30 RX ORDER — LANCETS
EACH MISCELLANEOUS
Qty: 200 EACH | Refills: 5 | Status: SHIPPED | OUTPATIENT
Start: 2017-08-30

## 2017-08-30 RX ORDER — INSULIN GLARGINE 100 [IU]/ML
40 INJECTION, SOLUTION SUBCUTANEOUS
Qty: 1 VIAL | Refills: 3 | Status: SHIPPED | OUTPATIENT
Start: 2017-08-30

## 2017-08-30 RX ORDER — INSULIN PUMP SYRINGE, 3 ML
EACH MISCELLANEOUS
Qty: 1 KIT | Refills: 0 | Status: SHIPPED | OUTPATIENT
Start: 2017-08-30

## 2017-08-30 RX ORDER — ATORVASTATIN CALCIUM 20 MG/1
20 TABLET, FILM COATED ORAL DAILY
Qty: 30 TAB | Refills: 2 | Status: SHIPPED | OUTPATIENT
Start: 2017-08-30

## 2017-08-30 RX ORDER — OMEPRAZOLE 40 MG/1
40 CAPSULE, DELAYED RELEASE ORAL DAILY
Qty: 30 CAP | Refills: 2 | Status: SHIPPED | OUTPATIENT
Start: 2017-08-30

## 2017-08-30 RX ORDER — INSULIN ASPART 100 [IU]/ML
25 INJECTION, SOLUTION INTRAVENOUS; SUBCUTANEOUS
Qty: 10 ML | Refills: 2 | Status: SHIPPED | OUTPATIENT
Start: 2017-08-30

## 2017-09-07 NOTE — ED NOTES
Pt discharged to home ambulatory and in company of friend  Discharge instructions provided via discussion and handout. Teaching to patient. Verbalized understanding. No questions voiced. Discharged with 0 RX. Pt presents after a cat bite that occurred at 0830 yesterday morning.  Pt was bit in the right hand and has scratches up her right arm.  She was trying to catch her step-fathers cats.  She is unsure if this cat was vaccinated, but states the cats are indoor only.
